# Patient Record
Sex: FEMALE | Race: WHITE | Employment: OTHER | URBAN - NONMETROPOLITAN AREA
[De-identification: names, ages, dates, MRNs, and addresses within clinical notes are randomized per-mention and may not be internally consistent; named-entity substitution may affect disease eponyms.]

---

## 2020-10-05 RX ORDER — PERINDOPRIL ERBUMINE 8 MG/1
TABLET ORAL
Qty: 90 TAB | Refills: 2 | Status: SHIPPED | OUTPATIENT
Start: 2020-10-05 | End: 2021-07-29

## 2020-10-19 ENCOUNTER — OFFICE VISIT (OUTPATIENT)
Dept: FAMILY MEDICINE CLINIC | Age: 75
End: 2020-10-19
Payer: MEDICARE

## 2020-10-19 ENCOUNTER — HOSPITAL ENCOUNTER (OUTPATIENT)
Dept: LAB | Age: 75
Discharge: HOME | End: 2020-10-19
Payer: MEDICARE

## 2020-10-19 VITALS
TEMPERATURE: 97.9 F | WEIGHT: 117 LBS | OXYGEN SATURATION: 98 % | BODY MASS INDEX: 21.53 KG/M2 | SYSTOLIC BLOOD PRESSURE: 139 MMHG | DIASTOLIC BLOOD PRESSURE: 76 MMHG | HEIGHT: 62 IN | HEART RATE: 72 BPM

## 2020-10-19 DIAGNOSIS — E55.9 HYPOVITAMINOSIS D: ICD-10-CM

## 2020-10-19 DIAGNOSIS — R41.3 MEMORY CHANGES: ICD-10-CM

## 2020-10-19 DIAGNOSIS — I82.419 DEEP VENOUS THROMBOSIS OF FEMORAL VEIN WITH THROMBOPHLEBITIS, UNSPECIFIED LATERALITY (HCC): ICD-10-CM

## 2020-10-19 DIAGNOSIS — M85.859 OSTEOPENIA OF NECK OF FEMUR, UNSPECIFIED LATERALITY: ICD-10-CM

## 2020-10-19 DIAGNOSIS — I10 ESSENTIAL HYPERTENSION: ICD-10-CM

## 2020-10-19 DIAGNOSIS — E53.8 VITAMIN B 12 DEFICIENCY: Primary | ICD-10-CM

## 2020-10-19 PROCEDURE — G8400 PT W/DXA NO RESULTS DOC: HCPCS | Performed by: FAMILY MEDICINE

## 2020-10-19 PROCEDURE — 99214 OFFICE O/P EST MOD 30 MIN: CPT | Performed by: FAMILY MEDICINE

## 2020-10-19 PROCEDURE — 1090F PRES/ABSN URINE INCON ASSESS: CPT | Performed by: FAMILY MEDICINE

## 2020-10-19 PROCEDURE — 1101F PT FALLS ASSESS-DOCD LE1/YR: CPT | Performed by: FAMILY MEDICINE

## 2020-10-19 PROCEDURE — G8420 CALC BMI NORM PARAMETERS: HCPCS | Performed by: FAMILY MEDICINE

## 2020-10-19 PROCEDURE — 82607 VITAMIN B-12: CPT

## 2020-10-19 PROCEDURE — G8510 SCR DEP NEG, NO PLAN REQD: HCPCS | Performed by: FAMILY MEDICINE

## 2020-10-19 PROCEDURE — 82746 ASSAY OF FOLIC ACID SERUM: CPT

## 2020-10-19 PROCEDURE — G8536 NO DOC ELDER MAL SCRN: HCPCS | Performed by: FAMILY MEDICINE

## 2020-10-19 PROCEDURE — G8427 DOCREV CUR MEDS BY ELIG CLIN: HCPCS | Performed by: FAMILY MEDICINE

## 2020-10-19 NOTE — PROGRESS NOTES
Subjective: Sheryl Lancaster is a 67 y.o. female who was seen for Follow-up    HPI patient is a 42-year-old female who is seen for evaluation. No nausea vomiting or diarrhea. No cough or cold. No chest pain or shortness of breath. No rash no syncope no loss of consciousness. Bowel movements have been appropriate. Eating relatively well. Her son called and said several of his mother's friends were concerned about her repeating stories etc.  She does not do the family budget. There is no chest pain or shortness of breath. Bowel movements have been appropriate. Nobody at home is been sick. No falls or injuries no rash no syncope or loss of consciousness. She does not think she has any memory issues that are significant. Her mother had a late onset memory issues. She is eating well she is under a significant amount of stress from her children and grandchildren. Home Medications    Medication Sig Start Date End Date Taking? Authorizing Provider   perindopril (ACEON) 8 mg tablet TAKE ONE TABLET BY MOUTH EVERY DAY FOR HIGH BLOOD PRESSURE 10/5/20   Romina Allen MD      No Known Allergies  Social History     Tobacco Use    Smoking status: Never Smoker    Smokeless tobacco: Former User   Substance Use Topics    Alcohol use: Not on file    Drug use: Never            Review of Systems   Constitutional: Positive for fatigue. HENT: Negative. Eyes: Negative. Respiratory: Negative. Cardiovascular: Negative. Gastrointestinal: Negative. Genitourinary: Negative. Musculoskeletal: Negative. Allergic/Immunologic: Negative. Neurological: Negative. Hematological: Negative. Psychiatric/Behavioral: Negative.          Physical Exam   Objective:     Visit Vitals  /76 (BP 1 Location: Left arm, BP Patient Position: Sitting)   Pulse 72   Temp 97.9 °F (36.6 °C) (Temporal)   Ht 5' 2\" (1.575 m)   Wt 117 lb (53.1 kg)   SpO2 98%   BMI 21.40 kg/m²      General: alert, cooperative, no distress   Mental  status: normal mood, behavior, speech, dress, motor activity, and thought processes, able to follow commands   HENT: NCAT   Neck: no visualized mass   Resp: no respiratory distress   Neuro: no gross deficits   Skin: no discoloration or lesions of concern on visible areas   Psychiatric: normal affect, consistent with stated mood, no evidence of hallucinations   L signs are stable. She is oriented x3 she is not clinically anxious or depressed. She has no edema she has no rash. Assessment & Plan:     Memory changes history of DVT hypertension hypovitaminosis D: This was a 40-minute evaluation. It was face-to-face. I performed a Mini-Mental status exam it was 26 out of 30 she seems to be clear a little emotionally upset by the fact that her sons asked her to get this and did not tell her why I am going to get a CAT scan I am going to get a B12 level because of B12 deficiency I am going to get a folic acid level as well. I do not think she has significant dementia. I do think the scan might help she has been on medicine for DVT for greater than 6 months we may need to consider stopping that        712    Additional exam findings: We discussed the expected course, resolution and complications of the diagnosis(es) in detail. Medication risks, benefits, costs, interactions, and alternatives were discussed as indicated. I advised her to contact the office if her condition worsens, changes or fails to improve as anticipated. She expressed understanding with the diagnosis(es) and plan.

## 2020-10-19 NOTE — PROGRESS NOTES
Jamaal Summers presents today for   Chief Complaint   Patient presents with    Follow-up       Is someone accompanying this pt? no    Is the patient using any DME equipment during [address]? no    Depression Screening:  3 most recent PHQ Screens 10/19/2020   Little interest or pleasure in doing things Not at all   Feeling down, depressed, irritable, or hopeless Not at all   Total Score PHQ 2 0       Learning Assessment:  Learning Assessment 10/19/2020   PRIMARY LEARNER Patient   PRIMARY LANGUAGE ENGLISH   LEARNER PREFERENCE PRIMARY DEMONSTRATION   ANSWERED BY patient   RELATIONSHIP SELF       Abuse Screening:  No flowsheet data found. Fall Risk  Fall Risk Assessment, last 12 mths 10/19/2020   Able to walk? Yes   Fall in past 12 months? No       Health Maintenance reviewed and discussed and ordered per Provider. Health Maintenance Due   Topic Date Due    DTaP/Tdap/Td series (1 - Tdap) 04/28/1965    Shingrix Vaccine Age 50> (1 of 2) 04/28/1994    GLAUCOMA SCREENING Q2Y  04/28/2009    Bone Densitometry (Dexa) Screening  04/28/2009    Pneumococcal 65+ years (1 of 1 - PPSV23) 04/28/2009    Medicare Yearly Exam  04/01/2020    Flu Vaccine (1) 09/01/2020   . Coordination of Care:  1. Have you been to the ER, urgent care clinic since your last visit? Hospitalized since your last visit? no    2. Have you seen or consulted any other health care providers outside of the [address] [place] [address] since your last visit? Include any pap smears or colon screening.  no    Last UDS Checked no  Last Pain contract signed: no

## 2020-10-20 LAB
FOLATE SERPL-MCNC: 14.5 NG/ML (ref 5–21)
VIT B12 SERPL-MCNC: 1770 PG/ML (ref 193–986)

## 2020-10-22 ENCOUNTER — HOSPITAL ENCOUNTER (OUTPATIENT)
Dept: CT IMAGING | Age: 75
Discharge: HOME | End: 2020-10-22
Attending: FAMILY MEDICINE
Payer: MEDICARE

## 2020-10-22 DIAGNOSIS — R41.3 MEMORY CHANGES: ICD-10-CM

## 2020-10-22 PROCEDURE — 70450 CT HEAD/BRAIN W/O DYE: CPT

## 2020-10-25 ENCOUNTER — TELEPHONE (OUTPATIENT)
Dept: FAMILY MEDICINE CLINIC | Age: 75
End: 2020-10-25

## 2020-10-25 DIAGNOSIS — R41.3 MEMORY CHANGES: Primary | ICD-10-CM

## 2020-10-25 RX ORDER — MEMANTINE HYDROCHLORIDE 5 MG/1
5 TABLET ORAL DAILY
Qty: 60 TAB | Refills: 1 | Status: SHIPPED | OUTPATIENT
Start: 2020-10-25 | End: 2020-12-15

## 2020-10-25 NOTE — TELEPHONE ENCOUNTER
Patient. Her S52 folic acid were normal her CT of the head was normal as well. Her Namenda 5 mg twice daily. I do not know that it will help her Mini-Mental status was 26 but we will follow-up with her and see.

## 2020-12-15 ENCOUNTER — VIRTUAL VISIT (OUTPATIENT)
Dept: FAMILY MEDICINE CLINIC | Age: 75
End: 2020-12-15
Payer: MEDICARE

## 2020-12-15 DIAGNOSIS — R41.3 MEMORY CHANGES: Primary | ICD-10-CM

## 2020-12-15 DIAGNOSIS — F41.9 ANXIETY: Primary | ICD-10-CM

## 2020-12-15 PROCEDURE — 99442 PR PHYS/QHP TELEPHONE EVALUATION 11-20 MIN: CPT | Performed by: FAMILY MEDICINE

## 2020-12-15 RX ORDER — MEMANTINE HYDROCHLORIDE 10 MG/1
10 TABLET ORAL 2 TIMES DAILY
Qty: 60 TAB | Refills: 1 | Status: SHIPPED | OUTPATIENT
Start: 2020-12-15 | End: 2021-05-04 | Stop reason: SDUPTHER

## 2020-12-15 NOTE — PROGRESS NOTES
Damion Warren is a 67 y.o. female, evaluated via audio-only technology on 12/15/2020 for No chief complaint on file. .    Assessment & Plan: Memory changes: I called the patient we increase her Namenda to 10 mg twice daily. I talked with her  today I talked with the son who is a nurse who reported several of her friends were reporting repeating the patient does not think it is an issue we put her on Namenda 5 mg she tolerated it well and will need to increase her to 10 mg this was a telephone to telephone visit it was 15 minutes. The patient was at home I was in my office. We will follow up in several months for reevaluation this was a 15-minute visit with voice to voice communication the patient at her home I was in my office       12  Subjective: I talked to the patient today her  was concerned that she was having increased forgetfulness episodes. She does not think she has gotten lost or anything is nothing to worry about but her son reported that and several friends of hers as well we placed her on Namenda 5 mg she tolerated it well I think we are going to increase her to 10 mg twice daily. She seems to be doing well with the medicine I am not sure the addition of Aricept would be helpful. She has not lost herself she is knows where she is going when she is driving she is complaining that her life is filled with stress from her children. Prior to Admission medications    Medication Sig Start Date End Date Taking? Authorizing Provider   memantine (Namenda) 5 mg tablet Take 1 Tab by mouth daily.  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 Eyes: Negative. Respiratory: Negative. Cardiovascular: Negative. Gastrointestinal: Negative. Genitourinary: Negative. Musculoskeletal: Negative. Skin: Negative. Neurological: Negative. Psychiatric/Behavioral: Positive for memory loss. No flowsheet data found. Jay Teran, who was evaluated through a patient-initiated, synchronous (real-time) audio only encounter, and/or her healthcare decision maker, is aware that it is a billable service, with coverage as determined by her insurance carrier. She provided verbal consent to proceed: n/a- consent obtained within past 12 months. She has not had a related appointment within my department in the past 7 days or scheduled within the next 24 hours.       Total Time: minutes: 11-20 minutes    Josseline Monroy MD

## 2020-12-16 RX ORDER — LORAZEPAM 0.5 MG/1
TABLET ORAL
Qty: 30 TAB | Refills: 0 | Status: SHIPPED | OUTPATIENT
Start: 2020-12-16

## 2021-03-25 ENCOUNTER — TELEPHONE (OUTPATIENT)
Dept: FAMILY MEDICINE CLINIC | Age: 76
End: 2021-03-25

## 2021-04-12 RX ORDER — RALOXIFENE HYDROCHLORIDE 60 MG/1
TABLET, FILM COATED ORAL
Qty: 90 TAB | Refills: 1 | Status: SHIPPED | OUTPATIENT
Start: 2021-04-12 | End: 2021-12-17 | Stop reason: SDUPTHER

## 2021-05-04 DIAGNOSIS — R41.3 MEMORY CHANGES: ICD-10-CM

## 2021-05-04 NOTE — TELEPHONE ENCOUNTER
Requested Prescriptions     Pending Prescriptions Disp Refills    memantine (Namenda) 10 mg tablet 60 Tab 1     Sig: Take 1 Tab by mouth two (2) times a day.          [facility] in [place]

## 2021-05-06 RX ORDER — MEMANTINE HYDROCHLORIDE 10 MG/1
10 TABLET ORAL 2 TIMES DAILY
Qty: 60 TAB | Refills: 1 | Status: SHIPPED | OUTPATIENT
Start: 2021-05-06 | End: 2021-09-07

## 2021-06-03 RX ORDER — AMLODIPINE BESYLATE 5 MG/1
TABLET ORAL
Qty: 90 TABLET | Refills: 1 | Status: SHIPPED | OUTPATIENT
Start: 2021-06-03 | End: 2021-12-03 | Stop reason: SDUPTHER

## 2021-06-04 ENCOUNTER — TRANSCRIBE ORDER (OUTPATIENT)
Dept: REGISTRATION | Age: 76
End: 2021-06-04

## 2021-06-04 ENCOUNTER — HOSPITAL ENCOUNTER (OUTPATIENT)
Dept: LAB | Age: 76
Discharge: HOME | End: 2021-06-04
Payer: MEDICARE

## 2021-06-04 DIAGNOSIS — G31.84 MCI (MILD COGNITIVE IMPAIRMENT) WITH MEMORY LOSS: Primary | ICD-10-CM

## 2021-06-04 DIAGNOSIS — G31.84 MCI (MILD COGNITIVE IMPAIRMENT) WITH MEMORY LOSS: ICD-10-CM

## 2021-06-04 LAB
FOLATE SERPL-MCNC: 14 NG/ML (ref 5–21)
TSH SERPL DL<=0.05 MIU/L-ACNC: 3.19 UIU/ML (ref 0.35–6.2)
VIT B12 SERPL-MCNC: 1210 PG/ML (ref 193–986)

## 2021-06-04 PROCEDURE — 84443 ASSAY THYROID STIM HORMONE: CPT

## 2021-06-04 PROCEDURE — 36415 COLL VENOUS BLD VENIPUNCTURE: CPT

## 2021-06-04 PROCEDURE — 82746 ASSAY OF FOLIC ACID SERUM: CPT

## 2021-06-04 PROCEDURE — 82607 VITAMIN B-12: CPT

## 2021-06-25 ENCOUNTER — TELEPHONE (OUTPATIENT)
Dept: FAMILY MEDICINE CLINIC | Age: 76
End: 2021-06-25

## 2021-06-25 NOTE — TELEPHONE ENCOUNTER
The patient.   The son called me and said she was having chest pain and crying to the patient on the phone I advised her she needed to go to the ER

## 2021-07-29 RX ORDER — PERINDOPRIL ERBUMINE 8 MG/1
TABLET ORAL
Qty: 90 TABLET | Refills: 2 | Status: SHIPPED | OUTPATIENT
Start: 2021-07-29 | End: 2022-08-30

## 2021-08-10 ENCOUNTER — APPOINTMENT (OUTPATIENT)
Dept: CT IMAGING | Age: 76
End: 2021-08-10
Attending: EMERGENCY MEDICINE
Payer: MEDICARE

## 2021-08-10 ENCOUNTER — HOSPITAL ENCOUNTER (EMERGENCY)
Age: 76
Discharge: HOME | End: 2021-08-10
Attending: EMERGENCY MEDICINE
Payer: MEDICARE

## 2021-08-10 VITALS
HEIGHT: 62 IN | TEMPERATURE: 97.4 F | OXYGEN SATURATION: 97 % | DIASTOLIC BLOOD PRESSURE: 79 MMHG | SYSTOLIC BLOOD PRESSURE: 147 MMHG | WEIGHT: 117 LBS | HEART RATE: 77 BPM | RESPIRATION RATE: 16 BRPM | BODY MASS INDEX: 21.53 KG/M2

## 2021-08-10 DIAGNOSIS — R51.9 OCCIPITAL HEADACHE: ICD-10-CM

## 2021-08-10 DIAGNOSIS — E87.6 HYPOKALEMIA: ICD-10-CM

## 2021-08-10 DIAGNOSIS — R11.2 NON-INTRACTABLE VOMITING WITH NAUSEA, UNSPECIFIED VOMITING TYPE: ICD-10-CM

## 2021-08-10 DIAGNOSIS — F41.8 SITUATIONAL ANXIETY: Primary | ICD-10-CM

## 2021-08-10 LAB
ALBUMIN SERPL-MCNC: 4.5 G/DL (ref 3.5–4.7)
ALBUMIN/GLOB SERPL: 1.4 {RATIO}
ALP SERPL-CCNC: 56 U/L (ref 38–126)
ALT SERPL-CCNC: 17 U/L (ref 3–52)
ANION GAP SERPL CALC-SCNC: 11 MMOL/L
APPEARANCE UR: CLEAR
AST SERPL W P-5'-P-CCNC: 26 U/L (ref 14–74)
BASOPHILS # BLD: 0 K/UL (ref 0–0.1)
BASOPHILS NFR BLD: 1 % (ref 0–2)
BILIRUB SERPL-MCNC: 0.8 MG/DL (ref 0.2–1)
BILIRUB UR QL: NEGATIVE
BUN SERPL-MCNC: 11 MG/DL (ref 9–21)
BUN/CREAT SERPL: 22
CA-I BLD-MCNC: 9.5 MG/DL (ref 8.5–10.5)
CHLORIDE SERPL-SCNC: 99 MMOL/L (ref 94–111)
CO2 SERPL-SCNC: 27 MMOL/L (ref 21–33)
COLOR UR: YELLOW
CREAT SERPL-MCNC: 0.5 MG/DL (ref 0.7–1.2)
EOSINOPHIL # BLD: 0 K/UL (ref 0–0.4)
EOSINOPHIL NFR BLD: 0 % (ref 0–5)
ERYTHROCYTE [DISTWIDTH] IN BLOOD BY AUTOMATED COUNT: 13.1 % (ref 11.6–14.5)
ERYTHROCYTE [SEDIMENTATION RATE] IN BLOOD: >1 MM/HR
GLOBULIN SER CALC-MCNC: 3.2 G/DL
GLUCOSE SERPL-MCNC: 108 MG/DL (ref 70–110)
GLUCOSE UR STRIP.AUTO-MCNC: NEGATIVE MG/DL
HCT VFR BLD AUTO: 42.8 % (ref 35–45)
HGB BLD-MCNC: 14 G/DL (ref 12–16)
HGB UR QL STRIP: NEGATIVE
IMM GRANULOCYTES # BLD AUTO: 0 K/UL
IMM GRANULOCYTES NFR BLD AUTO: 0 %
KETONES UR QL STRIP.AUTO: NEGATIVE MG/DL
LEUKOCYTE ESTERASE UR QL STRIP.AUTO: NEGATIVE
LYMPHOCYTES # BLD: 0.8 K/UL (ref 0.9–3.6)
LYMPHOCYTES NFR BLD: 13 % (ref 21–52)
MCH RBC QN AUTO: 30.9 PG (ref 24–34)
MCHC RBC AUTO-ENTMCNC: 32.7 G/DL (ref 31–37)
MCV RBC AUTO: 94.5 FL (ref 74–97)
MONOCYTES # BLD: 0.4 K/UL (ref 0.05–1.2)
MONOCYTES NFR BLD: 6 % (ref 3–10)
NEUTS SEG # BLD: 5.1 K/UL (ref 1.8–8)
NEUTS SEG NFR BLD: 80 % (ref 40–73)
NITRITE UR QL STRIP.AUTO: NEGATIVE
PH UR STRIP: >8.5 [PH] (ref 5–8)
PLATELET # BLD AUTO: 249 K/UL (ref 135–420)
PMV BLD AUTO: 10.4 FL (ref 9.2–11.8)
POTASSIUM SERPL-SCNC: 3.1 MMOL/L (ref 3.2–5.1)
PROT SERPL-MCNC: 7.7 G/DL (ref 6.1–8.4)
PROT UR STRIP-MCNC: NEGATIVE MG/DL
RBC # BLD AUTO: 4.53 M/UL (ref 4.2–5.3)
SODIUM SERPL-SCNC: 137 MMOL/L (ref 135–145)
SP GR UR REFRACTOMETRY: 1.01 (ref 1–1.03)
UROBILINOGEN UR QL STRIP.AUTO: 0.2 EU/DL (ref 0.2–1)
WBC # BLD AUTO: 6.4 K/UL (ref 4.6–13.2)

## 2021-08-10 PROCEDURE — 85651 RBC SED RATE NONAUTOMATED: CPT

## 2021-08-10 PROCEDURE — 85025 COMPLETE CBC W/AUTO DIFF WBC: CPT

## 2021-08-10 PROCEDURE — 74011250636 HC RX REV CODE- 250/636: Performed by: EMERGENCY MEDICINE

## 2021-08-10 PROCEDURE — 80053 COMPREHEN METABOLIC PANEL: CPT

## 2021-08-10 PROCEDURE — 70450 CT HEAD/BRAIN W/O DYE: CPT

## 2021-08-10 PROCEDURE — 99283 EMERGENCY DEPT VISIT LOW MDM: CPT

## 2021-08-10 PROCEDURE — 96374 THER/PROPH/DIAG INJ IV PUSH: CPT

## 2021-08-10 PROCEDURE — 81003 URINALYSIS AUTO W/O SCOPE: CPT

## 2021-08-10 PROCEDURE — 75810000275 HC EMERGENCY DEPT VISIT NO LEVEL OF CARE

## 2021-08-10 PROCEDURE — 96375 TX/PRO/DX INJ NEW DRUG ADDON: CPT

## 2021-08-10 PROCEDURE — 74011250637 HC RX REV CODE- 250/637: Performed by: EMERGENCY MEDICINE

## 2021-08-10 RX ORDER — POTASSIUM CHLORIDE 750 MG/1
40 TABLET, EXTENDED RELEASE ORAL
Status: COMPLETED | OUTPATIENT
Start: 2021-08-10 | End: 2021-08-10

## 2021-08-10 RX ORDER — ONDANSETRON 4 MG/1
4 TABLET, ORALLY DISINTEGRATING ORAL
Qty: 10 TABLET | Refills: 0 | Status: SHIPPED | OUTPATIENT
Start: 2021-08-10 | End: 2022-08-18 | Stop reason: ALTCHOICE

## 2021-08-10 RX ORDER — ONDANSETRON 2 MG/ML
4 INJECTION INTRAMUSCULAR; INTRAVENOUS
Status: COMPLETED | OUTPATIENT
Start: 2021-08-10 | End: 2021-08-10

## 2021-08-10 RX ORDER — KETOROLAC TROMETHAMINE 30 MG/ML
15 INJECTION, SOLUTION INTRAMUSCULAR; INTRAVENOUS
Status: COMPLETED | OUTPATIENT
Start: 2021-08-10 | End: 2021-08-10

## 2021-08-10 RX ADMIN — KETOROLAC TROMETHAMINE 15 MG: 30 INJECTION, SOLUTION INTRAMUSCULAR; INTRAVENOUS at 17:23

## 2021-08-10 RX ADMIN — ONDANSETRON 4 MG: 2 INJECTION INTRAMUSCULAR; INTRAVENOUS at 17:24

## 2021-08-10 RX ADMIN — POTASSIUM CHLORIDE 40 MEQ: 10 TABLET, EXTENDED RELEASE ORAL at 19:11

## 2021-08-10 NOTE — ED TRIAGE NOTES
Pt states that she feels \"bad\" and has not eaten anything since this am, feels nauseated at times.

## 2021-08-15 NOTE — ED PROVIDER NOTES
EMERGENCY DEPARTMENT HISTORY AND PHYSICAL EXAM      Date: 8/10/2021  Patient Name: Andry Green    History of Presenting Illness     Chief Complaint   Patient presents with    Nausea       History Provided By: Patient    HPI: Andry Green, 67 y.o. female with a past medical history significant Hypertension, DVT presents to the ED with cc of nausea. Patient brought in by  who reports patient has been feeling bad since this morning. Patient states she just feels sick and seems to be nauseated. He however denies any pain. No vomiting or diarrhea. He is having difficulties explaining his illness however  states he recently took her to her PCP who advised her she has early memory loss. This morning  told patient she most likely has dementia and she became very upset.  states patient seem to have pain in her neck and was complaining of pain behind her head . Francisca  There is no history of previous headaches.  stated patient does not know she has dementia but her PCP advised she most likely has dementia. She was a started on Namenda. There are no other complaints, changes, or physical findings at this time. PCP: David Garland MD    No current facility-administered medications on file prior to encounter. Current Outpatient Medications on File Prior to Encounter   Medication Sig Dispense Refill    perindopril (ACEON) 8 mg tablet TAKE ONE TABLET BY MOUTH EVERY DAY FOR HIGH BLOOD PRESSURE 90 Tablet 2    amLODIPine (NORVASC) 5 mg tablet TAKE ONE TABLET BY MOUTH ONE TIME DAILY 90 Tablet 1    memantine (Namenda) 10 mg tablet Take 1 Tab by mouth two (2) times a day.  60 Tab 1    raloxifene (EVISTA) 60 mg tablet TAKE ONE TABLET BY MOUTH ONE TIME DAILY 90 Tab 1    LORazepam (ATIVAN) 0.5 mg tablet take 1 tablet by mouth once daily for 30 DAYS 30 Tab 0       Past History     Past Medical History:  Past Medical History:   Diagnosis Date    Deep vein blood clot of right lower extremity ([facility] [address] 01/05/2020    Hypertension        Past Surgical History:  History reviewed. No pertinent surgical history. Family History:  History reviewed. No pertinent family history. Social History:  Social History     Tobacco Use    Smoking status: Never Smoker    Smokeless tobacco: Former User   Substance Use Topics    Alcohol use: Not on file    Drug use: Never       Allergies:  No Known Allergies      Review of Systems     Review of Systems   Constitutional: Positive for appetite change. Negative for chills and fever. HENT: Negative for congestion and sore throat. Respiratory: Negative for cough and shortness of breath. Cardiovascular: Negative for chest pain. Gastrointestinal: Positive for nausea. Negative for abdominal distention and vomiting. Genitourinary: Negative for difficulty urinating, dysuria, flank pain, frequency, hematuria, urgency, vaginal bleeding and vaginal discharge. Musculoskeletal: Negative for arthralgias and joint swelling. Skin: Negative for rash and wound. Neurological: Negative for dizziness, weakness, light-headedness and headaches. Hematological: Negative for adenopathy. Psychiatric/Behavioral: Positive for dysphoric mood. The patient is nervous/anxious. Physical Exam   Physical Exam  Vitals and nursing note reviewed. Constitutional:       General: She is not in acute distress. Appearance: She is well-developed. She is not diaphoretic. Comments: Elderly female who is in no acute distress but is upset. She states she is upset because she is \"just sick\". She however is able to follow commands. HENT:      Head: Normocephalic and atraumatic. Jaw: No trismus. Right Ear: External ear normal. No swelling or tenderness. Tympanic membrane is not perforated, erythematous or bulging. Left Ear: External ear normal. No swelling or tenderness. Tympanic membrane is not perforated, erythematous or bulging.       Nose: Nose normal. No mucosal edema or rhinorrhea. Right Sinus: No maxillary sinus tenderness or frontal sinus tenderness. Left Sinus: No maxillary sinus tenderness or frontal sinus tenderness. Mouth/Throat:      Mouth: No oral lesions. Dentition: No dental abscesses. Pharynx: Uvula midline. No oropharyngeal exudate, posterior oropharyngeal erythema or uvula swelling. Tonsils: No tonsillar abscesses. Eyes:      General: No scleral icterus. Right eye: No discharge. Left eye: No discharge. Conjunctiva/sclera: Conjunctivae normal.   Cardiovascular:      Rate and Rhythm: Normal rate and regular rhythm. Heart sounds: Normal heart sounds. No murmur heard. No friction rub. No gallop. Pulmonary:      Effort: Pulmonary effort is normal. No tachypnea, accessory muscle usage or respiratory distress. Breath sounds: Normal breath sounds. No decreased breath sounds, wheezing, rhonchi or rales. Abdominal:      Palpations: Abdomen is soft. Musculoskeletal:         General: No tenderness. Normal range of motion. Cervical back: Normal range of motion and neck supple. Lymphadenopathy:      Cervical: No cervical adenopathy. Skin:     General: Skin is warm and dry. Findings: No erythema or rash. Neurological:      General: No focal deficit present. Mental Status: She is alert and oriented to person, place, and time. Cranial Nerves: No cranial nerve deficit. Sensory: No sensory deficit. Psychiatric:         Judgment: Judgment normal.         Lab and Diagnostic Study Results     Labs -   No results found for this or any previous visit (from the past 12 hour(s)). Radiologic Studies -   @lastxrresult@  CT Results  (Last 48 hours)    None        CXR Results  (Last 48 hours)    None            Medical Decision Making   - I am the first provider for this patient.     - I reviewed the vital signs, available nursing notes, past medical history, past surgical history, family history and social history. - Initial assessment performed. The patients presenting problems have been discussed, and they are in agreement with the care plan formulated and outlined with them. I have encouraged them to ask questions as they arise throughout their visit. Vital Signs-Reviewed the patient's vital signs. No data found. Records Reviewed: Nursing Notes, Old Medical Records, Previous Radiology Studies and Previous Laboratory Studies    The patient presents with nausea and upset with a differential diagnosis of anxiety, panic attack, medication reaction, depression, electrolyte abnormalities, UTI      ED Course:   Patient with possible early dementia. He is oriented x3 in ED though. She does still complain of pain upper part of the neck necessitating CT scan of head which is negative. Potassium is slightly low and it was replaced with 40 mEq orally. She appears stable. She seemed to improve with a small dose of Ativan. I encouraged her  to have patient follow-up with PCP or return to ED should symptoms worsen. Provider Notes (Medical Decision Making):           Procedures   Medical Decision Makingedical Decision Making      Disposition   Disposition: Condition stable and improved  DC- Adult Discharges: All of the diagnostic tests were reviewed and questions answered. Diagnosis, care plan and treatment options were discussed. The patient understands the instructions and will follow up as directed. The patients results have been reviewed with them. They have been counseled regarding their diagnosis. The patient and spouse/SO verbally convey understanding and agreement of the signs, symptoms, diagnosis, treatment and prognosis and additionally agrees to follow up as recommended with their PCP in 24 - 48 hours. They also agree with the care-plan and convey that all of their questions have been answered.   I have also put together some discharge instructions for them that include: 1) educational information regarding their diagnosis, 2) how to care for their diagnosis at home, as well a 3) list of reasons why they would want to return to the ED prior to their follow-up appointment, should their condition change. Diagnosis:   1. Situational anxiety    2. Occipital headache    3. Hypokalemia    4. Non-intractable vomiting with nausea, unspecified vomiting type          Disposition:     Follow-up Information     Follow up With Specialties Details Why Vanessa Fried MD [facility]   [address] [facility] [address]  [phone]            Discharge Medication List as of 8/10/2021  7:12 PM      START taking these medications    Details   ondansetron (Zofran ODT) 4 mg disintegrating tablet 1 Tablet by SubLINGual route every eight (8) hours as needed for Nausea or Vomiting., Normal, Disp-10 Tablet, R-0         CONTINUE these medications which have NOT CHANGED    Details   perindopril (ACEON) 8 mg tablet TAKE ONE TABLET BY MOUTH EVERY DAY FOR HIGH BLOOD PRESSURE, Normal, Disp-90 Tablet, R-2      amLODIPine (NORVASC) 5 mg tablet TAKE ONE TABLET BY MOUTH ONE TIME DAILY, Normal, Disp-90 Tablet, R-1      memantine (Namenda) 10 mg tablet Take 1 Tab by mouth two (2) times a day., Normal, Disp-60 Tab, R-1      raloxifene (EVISTA) 60 mg tablet TAKE ONE TABLET BY MOUTH ONE TIME DAILY, Normal, Disp-90 Tab, R-1      LORazepam (ATIVAN) 0.5 mg tablet take 1 tablet by mouth once daily for 30 DAYS, Normal, Disp-30 Tab, R-0             DISCHARGE PLAN:  1. Cannot display discharge medications since this patient is not currently admitted. 2.   Follow-up Information     Follow up With Specialties Details Why Vanessa Fried MD [address] [address]  [phone]          3. Return to ED if worse   4.    Discharge Medication List as of 8/10/2021  7:12 PM      START taking these medications Details   ondansetron (Zofran ODT) 4 mg disintegrating tablet 1 Tablet by SubLINGual route every eight (8) hours as needed for Nausea or Vomiting., Normal, Disp-10 Tablet, R-0         CONTINUE these medications which have NOT CHANGED    Details   perindopril (ACEON) 8 mg tablet TAKE ONE TABLET BY MOUTH EVERY DAY FOR HIGH BLOOD PRESSURE, Normal, Disp-90 Tablet, R-2      amLODIPine (NORVASC) 5 mg tablet TAKE ONE TABLET BY MOUTH ONE TIME DAILY, Normal, Disp-90 Tablet, R-1      memantine (Namenda) 10 mg tablet Take 1 Tab by mouth two (2) times a day., Normal, Disp-60 Tab, R-1      raloxifene (EVISTA) 60 mg tablet TAKE ONE TABLET BY MOUTH ONE TIME DAILY, Normal, Disp-90 Tab, R-1      LORazepam (ATIVAN) 0.5 mg tablet take 1 tablet by mouth once daily for 30 DAYS, Normal, Disp-30 Tab, R-0               Diagnosis     Clinical Impression:   1. Situational anxiety    2. Occipital headache    3. Hypokalemia    4. Non-intractable vomiting with nausea, unspecified vomiting type        Attestations:    Tamia Latif MD    Please note that this dictation was completed with [email or web address], the computer voice recognition software. Quite often unanticipated grammatical, syntax, homophones, and other interpretive errors are inadvertently transcribed by the computer software. Please disregard these errors. Please excuse any errors that have escaped final proofreading. Thank you.

## 2021-09-06 DIAGNOSIS — R41.3 MEMORY CHANGES: ICD-10-CM

## 2021-09-07 RX ORDER — MEMANTINE HYDROCHLORIDE 10 MG/1
TABLET ORAL
Qty: 60 TABLET | Refills: 1 | Status: SHIPPED | OUTPATIENT
Start: 2021-09-07

## 2021-12-03 RX ORDER — AMLODIPINE BESYLATE 5 MG/1
5 TABLET ORAL DAILY
Qty: 90 TABLET | Refills: 0 | Status: SHIPPED | OUTPATIENT
Start: 2021-12-03 | End: 2022-02-17 | Stop reason: SDUPTHER

## 2021-12-03 NOTE — TELEPHONE ENCOUNTER
Patient has an appointment with you on 1/13/2021 but [facility]

## 2021-12-17 NOTE — TELEPHONE ENCOUNTER
Patient has an appointment 1/13. Fax request from [address] [place] [address] for Raloxifene.

## 2021-12-20 RX ORDER — RALOXIFENE HYDROCHLORIDE 60 MG/1
60 TABLET, FILM COATED ORAL DAILY
Qty: 90 TABLET | Refills: 0 | Status: SHIPPED | OUTPATIENT
Start: 2021-12-20 | End: 2022-01-13

## 2022-01-13 ENCOUNTER — HOSPITAL ENCOUNTER (OUTPATIENT)
Dept: LAB | Age: 77
Discharge: HOME | End: 2022-01-13
Payer: MEDICARE

## 2022-01-13 ENCOUNTER — OFFICE VISIT (OUTPATIENT)
Dept: FAMILY MEDICINE CLINIC | Age: 77
End: 2022-01-13
Payer: MEDICARE

## 2022-01-13 VITALS
SYSTOLIC BLOOD PRESSURE: 160 MMHG | HEART RATE: 81 BPM | DIASTOLIC BLOOD PRESSURE: 78 MMHG | WEIGHT: 115 LBS | BODY MASS INDEX: 21.16 KG/M2 | OXYGEN SATURATION: 98 % | TEMPERATURE: 97.5 F | HEIGHT: 62 IN

## 2022-01-13 DIAGNOSIS — M85.859 OSTEOPENIA OF NECK OF FEMUR, UNSPECIFIED LATERALITY: Primary | ICD-10-CM

## 2022-01-13 DIAGNOSIS — Z00.00 MEDICARE ANNUAL WELLNESS VISIT, SUBSEQUENT: ICD-10-CM

## 2022-01-13 DIAGNOSIS — I10 ESSENTIAL HYPERTENSION: ICD-10-CM

## 2022-01-13 DIAGNOSIS — F03.90 DEMENTIA WITHOUT BEHAVIORAL DISTURBANCE, UNSPECIFIED DEMENTIA TYPE: ICD-10-CM

## 2022-01-13 DIAGNOSIS — I82.419 DEEP VENOUS THROMBOSIS OF FEMORAL VEIN WITH THROMBOPHLEBITIS, UNSPECIFIED LATERALITY (HCC): ICD-10-CM

## 2022-01-13 LAB
25(OH)D3 SERPL-MCNC: 27.5 NG/ML (ref 30–100)
ALBUMIN SERPL-MCNC: 4.3 G/DL (ref 3.5–4.7)
ALBUMIN/GLOB SERPL: 1.4 {RATIO}
ALP SERPL-CCNC: 51 U/L (ref 38–126)
ALT SERPL-CCNC: 13 U/L (ref 3–52)
ANION GAP SERPL CALC-SCNC: 9 MMOL/L
AST SERPL W P-5'-P-CCNC: 21 U/L (ref 14–74)
BASOPHILS # BLD: 0.1 K/UL (ref 0–0.1)
BASOPHILS NFR BLD: 2 % (ref 0–2)
BILIRUB SERPL-MCNC: 0.8 MG/DL (ref 0.2–1)
BUN SERPL-MCNC: 14 MG/DL (ref 9–21)
BUN/CREAT SERPL: 20
CA-I BLD-MCNC: 9.6 MG/DL (ref 8.5–10.1)
CA-I BLD-MCNC: 9.7 MG/DL (ref 8.5–10.5)
CHLORIDE SERPL-SCNC: 101 MMOL/L (ref 94–111)
CHOLEST SERPL-MCNC: 230 MG/DL
CO2 SERPL-SCNC: 31 MMOL/L (ref 21–33)
CREAT SERPL-MCNC: 0.7 MG/DL (ref 0.7–1.2)
CREAT UR-MCNC: 15 MG/DL (ref 30–125)
DIFFERENTIAL METHOD BLD: NORMAL
EOSINOPHIL # BLD: 0.1 K/UL (ref 0–0.4)
EOSINOPHIL NFR BLD: 2 % (ref 0–5)
ERYTHROCYTE [DISTWIDTH] IN BLOOD BY AUTOMATED COUNT: 13.1 % (ref 11.6–14.5)
GLOBULIN SER CALC-MCNC: 3.1 G/DL
GLUCOSE SERPL-MCNC: 97 MG/DL (ref 70–110)
HCT VFR BLD AUTO: 41.3 % (ref 35–45)
HDLC SERPL-MCNC: 91 MG/DL (ref 40–60)
HDLC SERPL: 2.5 {RATIO} (ref 0–5)
HGB BLD-MCNC: 13.5 G/DL (ref 12–16)
IMM GRANULOCYTES # BLD AUTO: 0 K/UL (ref 0–0.04)
IMM GRANULOCYTES NFR BLD AUTO: 0 % (ref 0–0.5)
LDLC SERPL CALC-MCNC: 127.8 MG/DL (ref 0–100)
LIPID PROFILE,FLP: ABNORMAL
LYMPHOCYTES # BLD: 1.3 K/UL (ref 0.9–3.6)
LYMPHOCYTES NFR BLD: 24 % (ref 21–52)
MCH RBC QN AUTO: 31.5 PG (ref 24–34)
MCHC RBC AUTO-ENTMCNC: 32.7 G/DL (ref 31–37)
MCV RBC AUTO: 96.3 FL (ref 78–100)
MICROALBUMIN UR-MCNC: <0.5 MG/DL (ref 0–3)
MICROALBUMIN/CREAT UR-RTO: ABNORMAL MGMALB/GCRE (ref 0–30)
MONOCYTES # BLD: 0.4 K/UL (ref 0.05–1.2)
MONOCYTES NFR BLD: 8 % (ref 3–10)
NEUTS SEG # BLD: 3.4 K/UL (ref 1.8–8)
NEUTS SEG NFR BLD: 64 % (ref 40–73)
NRBC # BLD: 0 K/UL (ref 0–0.01)
NRBC BLD-RTO: 0 PER 100 WBC
PLATELET # BLD AUTO: 242 K/UL (ref 135–420)
PMV BLD AUTO: 10.7 FL (ref 9.2–11.8)
POTASSIUM SERPL-SCNC: 3.2 MMOL/L (ref 3.2–5.1)
PROT SERPL-MCNC: 7.4 G/DL (ref 6.1–8.4)
PTH-INTACT SERPL-MCNC: 40.1 PG/ML (ref 18.4–88)
RBC # BLD AUTO: 4.29 M/UL (ref 4.2–5.3)
SODIUM SERPL-SCNC: 141 MMOL/L (ref 135–145)
TRIGL SERPL-MCNC: 56 MG/DL (ref ?–150)
TSH SERPL DL<=0.05 MIU/L-ACNC: 3.71 UIU/ML (ref 0.35–6.2)
VIT B12 SERPL-MCNC: 728 PG/ML (ref 211–911)
VLDLC SERPL CALC-MCNC: 11.2 MG/DL
WBC # BLD AUTO: 5.3 K/UL (ref 4.6–13.2)

## 2022-01-13 PROCEDURE — G8400 PT W/DXA NO RESULTS DOC: HCPCS | Performed by: STUDENT IN AN ORGANIZED HEALTH CARE EDUCATION/TRAINING PROGRAM

## 2022-01-13 PROCEDURE — 85025 COMPLETE CBC W/AUTO DIFF WBC: CPT

## 2022-01-13 PROCEDURE — 82607 VITAMIN B-12: CPT

## 2022-01-13 PROCEDURE — 80061 LIPID PANEL: CPT

## 2022-01-13 PROCEDURE — G8420 CALC BMI NORM PARAMETERS: HCPCS | Performed by: STUDENT IN AN ORGANIZED HEALTH CARE EDUCATION/TRAINING PROGRAM

## 2022-01-13 PROCEDURE — 1101F PT FALLS ASSESS-DOCD LE1/YR: CPT | Performed by: STUDENT IN AN ORGANIZED HEALTH CARE EDUCATION/TRAINING PROGRAM

## 2022-01-13 PROCEDURE — G8754 DIAS BP LESS 90: HCPCS | Performed by: STUDENT IN AN ORGANIZED HEALTH CARE EDUCATION/TRAINING PROGRAM

## 2022-01-13 PROCEDURE — G8753 SYS BP > OR = 140: HCPCS | Performed by: STUDENT IN AN ORGANIZED HEALTH CARE EDUCATION/TRAINING PROGRAM

## 2022-01-13 PROCEDURE — 80053 COMPREHEN METABOLIC PANEL: CPT

## 2022-01-13 PROCEDURE — G8510 SCR DEP NEG, NO PLAN REQD: HCPCS | Performed by: STUDENT IN AN ORGANIZED HEALTH CARE EDUCATION/TRAINING PROGRAM

## 2022-01-13 PROCEDURE — 36415 COLL VENOUS BLD VENIPUNCTURE: CPT

## 2022-01-13 PROCEDURE — G8536 NO DOC ELDER MAL SCRN: HCPCS | Performed by: STUDENT IN AN ORGANIZED HEALTH CARE EDUCATION/TRAINING PROGRAM

## 2022-01-13 PROCEDURE — 82043 UR ALBUMIN QUANTITATIVE: CPT

## 2022-01-13 PROCEDURE — G8427 DOCREV CUR MEDS BY ELIG CLIN: HCPCS | Performed by: STUDENT IN AN ORGANIZED HEALTH CARE EDUCATION/TRAINING PROGRAM

## 2022-01-13 PROCEDURE — 99214 OFFICE O/P EST MOD 30 MIN: CPT | Performed by: STUDENT IN AN ORGANIZED HEALTH CARE EDUCATION/TRAINING PROGRAM

## 2022-01-13 PROCEDURE — G0439 PPPS, SUBSEQ VISIT: HCPCS | Performed by: STUDENT IN AN ORGANIZED HEALTH CARE EDUCATION/TRAINING PROGRAM

## 2022-01-13 PROCEDURE — 82306 VITAMIN D 25 HYDROXY: CPT

## 2022-01-13 PROCEDURE — 83970 ASSAY OF PARATHORMONE: CPT

## 2022-01-13 PROCEDURE — 84443 ASSAY THYROID STIM HORMONE: CPT

## 2022-01-13 RX ORDER — ASPIRIN 81 MG/1
81 TABLET ORAL DAILY
COMMUNITY

## 2022-01-13 RX ORDER — MAGNESIUM 200 MG
1000 TABLET ORAL DAILY
COMMUNITY

## 2022-01-13 NOTE — PROGRESS NOTES
Cheng  presents today for   Chief Complaint   Patient presents with   [address]    Medication Refill       Is someone accompanying this pt? Chary Wolf,      Is the patient using any DME equipment during [address]? No     Depression Screening:  3 most recent PHQ Screens 1/13/2022   Little interest or pleasure in doing things Not at all   Feeling down, depressed, irritable, or hopeless Not at all   Total Score PHQ 2 0       Learning Assessment:  Learning Assessment 10/19/2020   PRIMARY LEARNER Patient   PRIMARY LANGUAGE ENGLISH   LEARNER PREFERENCE PRIMARY DEMONSTRATION   ANSWERED BY patient   RELATIONSHIP SELF       Fall Risk  Fall Risk Assessment, last 12 mths 10/19/2020   Able to walk? Yes   Fall in past 12 months? No       Health Maintenance reviewed and discussed and ordered per Provider. Health Maintenance Due   Topic Date Due    Hepatitis C Screening  Never done    DTaP/Tdap/Td series (1 - Tdap) Never done    Shingrix Vaccine Age 50> (1 of 2) Never done    Bone Densitometry (Dexa) Screening  Never done    Pneumococcal 65+ years (1 of 1 - PPSV23) Never done    Medicare Yearly Exam  Never done   . Coordination of Care:  1. Have you been to the ER, urgent care clinic since your last visit? Hospitalized since your last visit? No     2. Have you seen or consulted any other health care providers outside of the [address] [place] [address] since your last visit? Include any pap smears or colon screening.  No

## 2022-01-17 NOTE — PROGRESS NOTES
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 female who was seen for Establish Care and Medication Refill    Patient here for medication refills and wellness visit. She has no complaints. She has a history of DVT and was previously on anticoagulation. She has been on raloxifene for many years when she was diagnosed with osteopenia. She has never had a repeat DEXA scan. No recent fractures. Home Medications    Medication Sig Start Date End Date Taking? Authorizing Provider   aspirin delayed-release 81 mg tablet Take 81 mg by mouth daily. Yes Provider, Historical   cyanocobalamin (VITAMIN B-12) 1,000 mcg sublingual tablet Take 1,000 mcg by mouth daily. Yes Provider, Historical   amLODIPine (NORVASC) 5 mg tablet Take 1 Tablet by mouth daily. 12/3/21  Yes Rosa Hsu MD   perindopril (ACEON) 8 mg tablet TAKE ONE TABLET BY MOUTH EVERY DAY FOR HIGH BLOOD PRESSURE 7/29/21  Yes Darcie Rivers MD   LORazepam (ATIVAN) 0.5 mg tablet take 1 tablet by mouth once daily for 30 DAYS 12/16/20  Yes Darcie Rivers MD   [facility]) 10 mg tablet take 1 tablet by mouth twice a day 9/7/21   Darcie Rivers MD   ondansetron (Zofran ODT) 4 mg disintegrating tablet 1 Tablet by SubLINGual route every eight (8) hours as needed for Nausea or Vomiting. Patient not taking: Reported on 1/13/2022 8/10/21   Reagan Lucia MD      No Known Allergies  Social History     Tobacco Use    Smoking status: Never Smoker    Smokeless tobacco: Former User   Substance Use Topics    Alcohol use: Not on file    Drug use: Never            Review of Systems   All other systems reviewed and are negative.          Objective:     Visit Vitals  BP (!) 160/78 (BP 1 Location: Left upper arm, BP Patient Position: Sitting, BP Cuff Size: Adult)   Pulse 81   Temp 97.5 °F (36.4 °C) (Oral)   Ht 5' 2\" (1.575 m)   Wt 115 lb (52.2 kg)   SpO2 98%   BMI 21.03 kg/m²        General: alert, oriented, not in distress  Head: scalp normal, atraumatic  Eyes: pupils are equal and reactive, full and intact EOM's  Ears: patent ear canal, intact tympanic membrane  Nose: normal turbinates, no congestion or discharge  Lips/Mouth: moist lips and buccal mucosa, non-enlarged tonsils, pink throat  Neck: supple, no JVD, no lymphadenopathy, non-palpable thyroid  Chest/Lungs: clear breath sounds, no wheezing or crackles  Heart: normal rate, regular rhythm, no murmur  Abdomen: soft, non-distended, non-tender, normal bowel sounds, no organomegaly, no masses  Extremities: no focal deformities, no edema  Skin: no active skin lesions    Laboratory/Tests:  Hospital Outpatient Visit on 01/13/2022   Component Date Value Ref Range Status    WBC 01/13/2022 5.3  4.6 - 13.2 K/uL Final    RBC 01/13/2022 4.29  4. 20 - 5.30 M/uL Final    HGB 01/13/2022 13.5  12.0 - 16.0 g/dL Final    HCT 01/13/2022 41.3  35.0 - 45.0 % Final    MCV 01/13/2022 96.3  78.0 - 100.0 FL Final    MCH 01/13/2022 31.5  24.0 - 34.0 PG Final    MCHC 01/13/2022 32.7  31.0 - 37.0 g/dL Final    RDW 01/13/2022 13.1  11.6 - 14.5 % Final    PLATELET 88/62/0755 758  135 - 420 K/uL Final    MPV 01/13/2022 10.7  9.2 - 11.8 FL Final    NRBC 01/13/2022 0.0  0.0  WBC Final    ABSOLUTE NRBC 01/13/2022 0.00  0.00 - 0.01 K/uL Final    NEUTROPHILS 01/13/2022 64  40 - 73 % Final    LYMPHOCYTES 01/13/2022 24  21 - 52 % Final    MONOCYTES 01/13/2022 8  3 - 10 % Final    EOSINOPHILS 01/13/2022 2  0 - 5 % Final    BASOPHILS 01/13/2022 2  0 - 2 % Final    IMMATURE GRANULOCYTES 01/13/2022 0  0 - 0.5 % Final    ABS. NEUTROPHILS 01/13/2022 3.4  1.8 - 8.0 K/UL Final    ABS. LYMPHOCYTES 01/13/2022 1.3  0.9 - 3.6 K/UL Final    ABS. MONOCYTES 01/13/2022 0.4  0.05 - 1.2 K/UL Final    ABS. EOSINOPHILS 01/13/2022 0.1  0.0 - 0.4 K/UL Final    ABS. BASOPHILS 01/13/2022 0.1  0.0 - 0.1 K/UL Final    ABS. IMM.  GRANS. 01/13/2022 0.0  0.00 - 0.04 K/UL Final    DF 01/13/2022 AUTOMATED    Final    Vitamin B12 01/13/2022 728  211 - 911 pg/mL Final    LIPID PROFILE 01/13/2022      Final    Cholesterol, total 01/13/2022 230* <200 mg/dL Final    Triglyceride 01/13/2022 56  <150 mg/dL Final    Comment: The drugs N-acetylcysteine (NAC) and  Metamiszole have been found to cause falsely  low results in this chemical assay. Please  be sure to submit blood samples obtained  BEFORE administration of either of these  drugs to assure correct results.  HDL Cholesterol 01/13/2022 91* 40 - 60 mg/dL Final    LDL, calculated 01/13/2022 127.8* 0 - 100 mg/dL Final    VLDL, calculated 01/13/2022 11.2  mg/dL Final    CHOL/HDL Ratio 01/13/2022 2.5  0 - 5.0   Final    Calcium 01/13/2022 9.6  8.5 - 10.1 mg/dL Final    PTH, Intact 01/13/2022 40.1  18.4 - 88.0 pg/mL Final    Microalbumin,urine random 01/13/2022 <0.50  0 - 3.0 mg/dL Final    Creatinine, urine 01/13/2022 15.00* 30 - 125 mg/dL Final    Microalbumin/Creat ratio (mg/g cre* 01/13/2022 Cannot calculate ratio due to microalbumin result outside reportable range. 0 - 30 mgMALB/gCRE Final    Vitamin D 25-Hydroxy 01/13/2022 27.5* 30 - 100 ng/mL Final    Comment: (NOTE)  Deficiency               <20 ng/mL  Insufficiency          20-30 ng/mL  Sufficient             ng/mL  Possible toxicity       >100 ng/mL    The Method used is Siemens Advia Centaur currently standardized to a   Center of Disease Control and Prevention (CDC) certified reference   [address]. Samples containing fluorescein dye can produce falsely   elevated values when tested with the ADVIA Centaur Vitamin D Assay. It is recommended that results in the toxic range, >100 ng/mL, be   retested 72 hours post fluorescein exposure.  TSH 01/13/2022 3.71  0.35 - 6.20 uIU/mL Final    Comment:    Due to TSH heterogeneity, both structurally and degree of glycosylation, monoclonal antibodies used in the TSH assay may not accurately quantitate TSH.  Therefore, this result should be correlated with clinical findings as well as with other assessments of thyroid function, e.g., free T4, free T3.  Sodium 01/13/2022 141  135 - 145 mmol/L Final    Potassium 01/13/2022 3.2  3.2 - 5.1 mmol/L Final    Chloride 01/13/2022 101  94 - 111 mmol/L Final    CO2 01/13/2022 31  21 - 33 mmol/L Final    Anion gap 01/13/2022 9  mmol/L Final    Glucose 01/13/2022 97  70 - 110 mg/dL Final    BUN 01/13/2022 14  9 - 21 mg/dL Final    Creatinine 01/13/2022 0.70  0.70 - 1.20 mg/dL Final    BUN/Creatinine ratio 01/13/2022 20    Final    GFR est AA 01/13/2022 >60  ml/min/1.73m2 Final    GFR est non-AA 01/13/2022 >60  ml/min/1.73m2 Final    Comment: Estimated GFR is calculated using the IDMS-traceable Modification of Diet in Renal Disease (MDRD) Study equation, reported for both  Americans (GFRAA) and non- Americans (GFRNA), and normalized to 1.73m2 body surface area. The physician must decide which value applies to the patient. The MDRD study equation should only be used in individuals age 25 or older. It has not been validated for the following: pregnant women, patients with serious comorbid conditions, or on certain medications, or persons with extremes of body size, muscle mass, or nutritional status.  Calcium 01/13/2022 9.7  8.5 - 10.5 mg/dL Final    Bilirubin, total 01/13/2022 0.8  0.2 - 1.0 mg/dL Final    AST (SGOT) 01/13/2022 21  14 - 74 U/L Final    ALT (SGPT) 01/13/2022 13  3 - 52 U/L Final    Alk.  phosphatase 01/13/2022 51  38 - 126 U/L Final    Protein, total 01/13/2022 7.4  6.1 - 8.4 g/dL Final    Albumin 01/13/2022 4.3  3.5 - 4.7 g/dL Final    Globulin 01/13/2022 3.1  g/dL Final    A-G Ratio 01/13/2022 1.4    Final   Admission on 08/10/2021, Discharged on 08/10/2021   Component Date Value Ref Range Status    WBC 08/10/2021 6.4  4.6 - 13.2 K/uL Final    RBC 08/10/2021 4.53  4.20 - 5.30 M/uL Final    HGB 08/10/2021 14.0  12.0 - 16.0 g/dL Final    HCT 08/10/2021 42.8  35.0 - 45.0 % Final    MCV 08/10/2021 94.5  74.0 - 97.0 FL Final    MCH 08/10/2021 30.9  24.0 - 34.0 PG Final    MCHC 08/10/2021 32.7  31.0 - 37.0 g/dL Final    RDW 08/10/2021 13.1  11.6 - 14.5 % Final    PLATELET 61/19/5283 006  135 - 420 K/uL Final    MPV 08/10/2021 10.4  9.2 - 11.8 FL Final    NEUTROPHILS 08/10/2021 80* 40 - 73 % Final    LYMPHOCYTES 08/10/2021 13* 21 - 52 % Final    MONOCYTES 08/10/2021 6  3 - 10 % Final    EOSINOPHILS 08/10/2021 0  0 - 5 % Final    BASOPHILS 08/10/2021 1  0 - 2 % Final    IMMATURE GRANULOCYTES 08/10/2021 0  % Final    ABS. NEUTROPHILS 08/10/2021 5.1  1.8 - 8.0 K/UL Final    ABS. LYMPHOCYTES 08/10/2021 0.8* 0.9 - 3.6 K/UL Final    ABS. MONOCYTES 08/10/2021 0.4  0.05 - 1.2 K/UL Final    ABS. EOSINOPHILS 08/10/2021 0.0  0.0 - 0.4 K/UL Final    ABS. BASOPHILS 08/10/2021 0.0  0.0 - 0.1 K/UL Final    ABS. IMM. GRANS. 08/10/2021 0.0  K/UL Final    Sodium 08/10/2021 137  135 - 145 mmol/L Final    Potassium 08/10/2021 3.1* 3.2 - 5.1 mmol/L Final    Chloride 08/10/2021 99  94 - 111 mmol/L Final    CO2 08/10/2021 27  21 - 33 mmol/L Final    Anion gap 08/10/2021 11  mmol/L Final    Glucose 08/10/2021 108  70 - 110 mg/dL Final    BUN 08/10/2021 11  9 - 21 mg/dL Final    Creatinine 08/10/2021 0.50* 0.70 - 1.20 mg/dL Final    BUN/Creatinine ratio 08/10/2021 22    Final    GFR est AA 08/10/2021 >60  ml/min/1.73m2 Final    GFR est non-AA 08/10/2021 >60  ml/min/1.73m2 Final    Comment: Estimated GFR is calculated using the IDMS-traceable Modification of Diet in Renal Disease (MDRD) Study equation, reported for both  Americans (GFRAA) and non- Americans (GFRNA), and normalized to 1.73m2 body surface area. The physician must decide which value applies to the patient. The MDRD study equation should only be used in individuals age 25 or older.  It has not been validated for the following: pregnant women, patients with serious comorbid conditions, or on certain medications, or persons with extremes of body size, muscle mass, or nutritional status.  Calcium 08/10/2021 9.5  8.5 - 10.5 mg/dL Final    Bilirubin, total 08/10/2021 0.8  0.2 - 1.0 mg/dL Final    AST (SGOT) 08/10/2021 26  14 - 74 U/L Final    ALT (SGPT) 08/10/2021 17  3 - 52 U/L Final    Alk. phosphatase 08/10/2021 56  38 - 126 U/L Final    Protein, total 08/10/2021 7.7  6.1 - 8.4 g/dL Final    Albumin 08/10/2021 4.5  3.5 - 4.7 g/dL Final    Globulin 08/10/2021 3.2  g/dL Final    A-G Ratio 08/10/2021 1.4    Final    Sed rate (ESR) 08/10/2021 >1  mm/hr Final    Color 08/10/2021 Yellow    Final    Color Reference Range: Straw, Yellow or Dark Yellow    Appearance 08/10/2021 Clear    Final    Specific gravity 08/10/2021 1.006  1.005 - 1.030   Final    pH (UA) 08/10/2021 >8.5* 5.0 - 8.0 Final    Protein 08/10/2021 Negative  Negative mg/dL Final    Glucose 08/10/2021 Negative  Negative mg/dL Final    Ketone 08/10/2021 Negative  Negative mg/dL Final    Bilirubin 08/10/2021 Negative  Negative   Final    Blood 08/10/2021 Negative  Negative   Final    Urobilinogen 08/10/2021 0.2  0.2 - 1.0 EU/dL Final    Nitrites 08/10/2021 Negative  Negative   Final    Leukocyte Esterase 08/10/2021 Negative  Negative   Final   Hospital Outpatient Visit on 06/04/2021   Component Date Value Ref Range Status    Vitamin B12 06/04/2021 1,210* 193 - 986 pg/mL Final    Folate 06/04/2021 14.0  5.0 - 21.0 ng/mL Final    TSH 06/04/2021 3.19  0.35 - 6.20 uIU/mL Final    Comment:    Due to TSH heterogeneity, both structurally and degree of glycosylation, monoclonal antibodies used in the TSH assay may not accurately quantitate TSH. Therefore, this result should be correlated with clinical findings as well as with other assessments of thyroid function, e.g., free T4, free T3. Assessment & Plan:     1. Dementia without behavioral disturbance, unspecified dementia type (HCC)  Continue namenda  - VITAMIN B12    2.  Essential hypertension  Continue perinodpril, norvasc    3. 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 Likely secondary to raloxifene. This medication was not discontinued after DVT treatment. Will discontinue raloxifene. 4. Osteopenia of neck of femur, unspecified laterality  Discontinue raloxifene due to hx of DVTs. Will repeat dexa scan and treat with bisphosphonates if she is osteoporotic.   - CBC WITH AUTOMATED DIFF  - METABOLIC PANEL, COMPREHENSIVE  - LIPID PANEL  - MICROALBUMIN, UR, RAND W/ MICROALB/CREAT RATIO  - VITAMIN D, 25 HYDROXY  - PTH INTACT  - TSH 3RD GENERATION    5. Medicare annual wellness visit, subsequent  Refer to separate note. I have discussed the diagnosis with the patient and the intended plan as seen in the above orders. The patient has received an after-visit summary and questions were answered concerning future plans. I have discussed medication side effects and warnings with the patient as well. I have reviewed the plan of care with the patient, accepted their input and they are in agreement with the treatment goals. Previous lab and imaging results were reviewed by me.        Jaquan Faustin MD  January 16, 2022

## 2022-01-17 NOTE — PROGRESS NOTES
This is the Subsequent Medicare Annual Wellness Exam, performed 12 months or more after the Initial AWV or the last Subsequent AWV    I have reviewed the patient's medical history in detail and updated the computerized patient record. Assessment/Plan   Education and counseling provided:  Are appropriate based on today's review and evaluation    1. Osteopenia of neck of femur, unspecified laterality  -     CBC WITH AUTOMATED DIFF  -     METABOLIC PANEL, COMPREHENSIVE  -     LIPID PANEL  -     MICROALBUMIN, UR, RAND W/ MICROALB/CREAT RATIO  -     VITAMIN D, 25 HYDROXY  -     PTH INTACT  -     TSH 3RD GENERATION  2. Dementia without behavioral disturbance, unspecified [facility] type ([facility]  -     VITAMIN B12  3. Essential hypertension  4. Deep venous thrombosis of femoral vein with thrombophlebitis, unspecified laterality ([facility]  5. Medicare annual wellness visit, subsequent       Depression Risk Factor Screening     3 most recent PHQ Screens 1/13/2022   Little interest or pleasure in doing things Not at all   Feeling down, depressed, irritable, or hopeless Not at all   Total Score PHQ 2 0       Alcohol & Drug Abuse Risk Screen    Do you average more than 1 drink per night or more than 7 drinks a week:  No    On any one occasion in the past three months have you have had more than 3 drinks containing alcohol:  No          Functional Ability and Level of Safety    Hearing: Hearing is good. Activities of Daily Living: The home contains: no safety equipment. Patient does total self care      Ambulation: with no difficulty     Fall Risk:  Fall Risk Assessment, last 12 mths 10/19/2020   Able to walk? Yes   Fall in past 12 months?  No      Abuse Screen:  Patient is not abused       Cognitive Screening    Has your family/caregiver stated any concerns about your memory: yes         Health Maintenance Due     Health Maintenance Due   Topic Date Due    Hepatitis C Screening  Never done    Bone Densitometry (Dexa) Screening  Never done    Medicare Yearly Exam  Never done       Patient Care Team   Patient Care Team:  Florin Bruner MD as PCP - General (Family Medicine)  Florin Bruner MD as PCP - [facility] Provider    History     Patient Active Problem List   Diagnosis Code    DVT femoral (deep venous thrombosis) with thrombophlebitis ([facility] I82.419    Essential hypertension I10    Hypovitaminosis D E55.9    Vitamin B 12 deficiency E53.8    Osteopenia of neck of femur M85.859    Memory changes R41.3     Past Medical History:   Diagnosis Date    Deep vein blood clot of right lower extremity ([facility] 01/05/2020    Hypertension       History reviewed. No pertinent surgical history. Current Outpatient Medications   Medication Sig Dispense Refill    aspirin delayed-release 81 mg tablet Take 81 mg by mouth daily.  cyanocobalamin (VITAMIN B-12) 1,000 mcg sublingual tablet Take 1,000 mcg by mouth daily.  amLODIPine (NORVASC) 5 mg tablet Take 1 Tablet by mouth daily. 90 Tablet 0    perindopril (ACEON) 8 mg tablet TAKE ONE TABLET BY MOUTH EVERY DAY FOR HIGH BLOOD PRESSURE 90 Tablet 2    LORazepam (ATIVAN) 0.5 mg tablet take 1 tablet by mouth once daily for 30 DAYS 30 Tab 0    memantine (NAMENDA) 10 mg tablet take 1 tablet by mouth twice a day 60 Tablet 1    ondansetron (Zofran ODT) 4 mg disintegrating tablet 1 Tablet by SubLINGual route every eight (8) hours as needed for Nausea or Vomiting. (Patient not taking: Reported on 1/13/2022) 10 Tablet 0     No Known Allergies    History reviewed. No pertinent family history.   Social History     Tobacco Use    Smoking status: Never Smoker    Smokeless tobacco: Former User   Substance Use Topics    Alcohol use: Not on file         Matt Meléndez MD

## 2022-02-17 RX ORDER — AMLODIPINE BESYLATE 5 MG/1
5 TABLET ORAL DAILY
Qty: 90 TABLET | Refills: 0 | Status: SHIPPED | OUTPATIENT
Start: 2022-02-17 | End: 2022-06-20 | Stop reason: SDUPTHER

## 2022-02-17 NOTE — TELEPHONE ENCOUNTER
Son Sohail [place] maria called for patient [phone], stated,he was checking to make sure patient was supposed to be taking the RX Amlodipine and RX Perindopril taking them together.

## 2022-03-18 PROBLEM — M85.859 OSTEOPENIA OF NECK OF FEMUR: Status: ACTIVE | Noted: 2020-10-19

## 2022-03-19 PROBLEM — I82.419 DVT FEMORAL (DEEP VENOUS THROMBOSIS) WITH THROMBOPHLEBITIS (HCC): Status: ACTIVE | Noted: 2020-10-19

## 2022-03-19 PROBLEM — I10 ESSENTIAL HYPERTENSION: Status: ACTIVE | Noted: 2020-10-19

## 2022-03-20 PROBLEM — R41.3 MEMORY CHANGES: Status: ACTIVE | Noted: 2020-10-19

## 2022-03-20 PROBLEM — E55.9 HYPOVITAMINOSIS D: Status: ACTIVE | Noted: 2020-10-19

## 2022-03-20 PROBLEM — E53.8 VITAMIN B 12 DEFICIENCY: Status: ACTIVE | Noted: 2020-10-19

## 2022-06-20 RX ORDER — AMLODIPINE BESYLATE 5 MG/1
5 TABLET ORAL DAILY
Qty: 90 TABLET | Refills: 0 | Status: SHIPPED | OUTPATIENT
Start: 2022-06-20 | End: 2022-09-12

## 2022-06-20 NOTE — TELEPHONE ENCOUNTER
Pt requesting refills,  leaving for vacation and Rx will run out  Requested Prescriptions     Pending Prescriptions Disp Refills    amLODIPine (NORVASC) 5 mg tablet 90 Tablet 0     Sig: Take 1 Tablet by mouth daily.

## 2022-08-18 ENCOUNTER — OFFICE VISIT (OUTPATIENT)
Dept: FAMILY MEDICINE CLINIC | Age: 77
End: 2022-08-18
Payer: MEDICARE

## 2022-08-18 VITALS
WEIGHT: 114.6 LBS | HEIGHT: 60 IN | DIASTOLIC BLOOD PRESSURE: 74 MMHG | RESPIRATION RATE: 18 BRPM | SYSTOLIC BLOOD PRESSURE: 134 MMHG | BODY MASS INDEX: 22.5 KG/M2 | HEART RATE: 96 BPM | OXYGEN SATURATION: 96 %

## 2022-08-18 DIAGNOSIS — I10 ESSENTIAL HYPERTENSION: Primary | ICD-10-CM

## 2022-08-18 DIAGNOSIS — F03.90 DEMENTIA WITHOUT BEHAVIORAL DISTURBANCE, UNSPECIFIED DEMENTIA TYPE: ICD-10-CM

## 2022-08-18 PROCEDURE — 1123F ACP DISCUSS/DSCN MKR DOCD: CPT | Performed by: NURSE PRACTITIONER

## 2022-08-18 PROCEDURE — 99213 OFFICE O/P EST LOW 20 MIN: CPT | Performed by: NURSE PRACTITIONER

## 2022-08-18 NOTE — PROGRESS NOTES
Nidhi Watson (: 1945) is a 65 y.o. female, established patient, here for evaluation of the following chief complaint(s):  Establish Care (72 y/o F present to clinic today to [facility]. Pt reports seeing Dr. Betsy Carlson. Pt reports she here for a check up. Pt reports no concerns. )       ASSESSMENT/PLAN:  Below is the assessment and plan developed based on review of pertinent history, physical exam, labs, studies, and medications. 1. Essential hypertension  Continue Norvasc 5 mg daily. Continue to avoid foods high in sodium and fried foods. 2. Dementia without behavioral disturbance, unspecified dementia type ([address]  No change per , currently being followed by Dr. Cathy Bumpers (neurology), however the  did schedule her with another neurologist in [facility] [place] for a second opinion regarding her dementia. No follow-ups on file. Declined mammography and Dexa bone screening. Secondary to her age. SUBJECTIVE/OBJECTIVE:  HPI  Mrs. Stevens Habermann is here for a routine follow up appointment she has no questions or concerns. She reports her appetite is good, no problems with her bowels or urinary issues. Denies any increase pain, shortness of breath or chest pains. She is schedule for an second opinion with a Neurologist on 22. Review of Systems   All other systems reviewed and are negative. Physical Exam  Cardiovascular:      Rate and Rhythm: Normal rate and regular rhythm. Pulses: Normal pulses. Heart sounds: Normal heart sounds. Pulmonary:      Effort: Pulmonary effort is normal.      Breath sounds: Normal breath sounds. Abdominal:      General: Bowel sounds are normal.   Neurological:      Mental Status: She is alert and oriented to person, place, and time. Psychiatric:         Cognition and Memory: Memory is impaired.          On this date 2022 I have spent 30 minutes reviewing previous notes, test results and face to face with the patient discussing the diagnosis and importance of compliance with the treatment plan as well as documenting on the day of the visit. An electronic signature was used to authenticate this note.   -- [address] [facility], NP-C

## 2022-08-18 NOTE — PROGRESS NOTES
Flynn Thompson presents today for   Chief Complaint   Patient presents with    [facility] Care     66 y/o F present to clinic today to [facility] care. Pt reports seeing Dr. Marika Roberts. Pt reports she here for a check up. Pt reports no concerns. Is someone accompanying this pt? Yes      Is the patient using any DME equipment during OV? NI    Depression Screening:  3 most recent PHQ Screens 8/18/2022   Little interest or pleasure in doing things Not at all   Feeling down, depressed, irritable, or hopeless Not at all   Total Score PHQ 2 0       Learning Assessment:  Learning Assessment 10/19/2020   PRIMARY LEARNER Patient   PRIMARY LANGUAGE ENGLISH   LEARNER PREFERENCE PRIMARY DEMONSTRATION   ANSWERED BY patient   RELATIONSHIP SELF       Fall Risk  Fall Risk Assessment, last 12 mths 8/18/2022   Able to walk? Yes   Fall in past 12 months? 0   Do you feel unsteady? 0   Are you worried about falling 0       Health Maintenance reviewed and discussed and ordered per Provider. Health Maintenance Due   Topic Date Due    Hepatitis C Screening  Never done    Bone Densitometry (Dexa) Screening  Never done   . Coordination of Care:    1. \"Have you been to the ER, urgent care clinic since your last visit? Hospitalized since your last visit? \" No    2. \"Have you seen or consulted any other health care providers outside of the [address] [place] [address] since your last visit? \" No     3. For patients aged 39-70: Has the patient had a colonoscopy? NA - based on age     If the patient is female:    4. For patients aged 41-77: Has the patient had a mammogram within the past 2 years? NA - based on age    11. For patients aged 21-65: Has the patient had a pap smear?  NA - based on age

## 2022-08-30 RX ORDER — PERINDOPRIL ERBUMINE 8 MG/1
TABLET ORAL
Qty: 90 TABLET | Refills: 2 | Status: SHIPPED | OUTPATIENT
Start: 2022-08-30

## 2022-09-12 RX ORDER — AMLODIPINE BESYLATE 5 MG/1
TABLET ORAL
Qty: 90 TABLET | Refills: 0 | Status: SHIPPED | OUTPATIENT
Start: 2022-09-12

## 2023-04-25 RX ORDER — AMLODIPINE BESYLATE 5 MG/1
TABLET ORAL
Qty: 90 TABLET | OUTPATIENT
Start: 2023-04-25

## 2023-04-29 ENCOUNTER — APPOINTMENT (OUTPATIENT)
Age: 78
End: 2023-04-29
Payer: MEDICARE

## 2023-04-29 ENCOUNTER — HOSPITAL ENCOUNTER (EMERGENCY)
Age: 78
Discharge: HOME | End: 2023-04-29
Attending: EMERGENCY MEDICINE
Payer: MEDICARE

## 2023-04-29 VITALS
RESPIRATION RATE: 18 BRPM | WEIGHT: 115 LBS | BODY MASS INDEX: 22.58 KG/M2 | OXYGEN SATURATION: 99 % | TEMPERATURE: 98.2 F | DIASTOLIC BLOOD PRESSURE: 76 MMHG | SYSTOLIC BLOOD PRESSURE: 140 MMHG | HEIGHT: 60 IN | HEART RATE: 70 BPM

## 2023-04-29 DIAGNOSIS — S42.201A CLOSED FRACTURE OF PROXIMAL END OF RIGHT HUMERUS, UNSPECIFIED FRACTURE MORPHOLOGY, INITIAL ENCOUNTER: Primary | ICD-10-CM

## 2023-04-29 DIAGNOSIS — F03.B0 MODERATE DEMENTIA WITHOUT BEHAVIORAL DISTURBANCE, PSYCHOTIC DISTURBANCE, MOOD DISTURBANCE, OR ANXIETY, UNSPECIFIED DEMENTIA TYPE (HCC): ICD-10-CM

## 2023-04-29 LAB
APPEARANCE UR: CLEAR
BILIRUB UR QL: NEGATIVE
COLOR UR: YELLOW
GLUCOSE UR STRIP.AUTO-MCNC: NEGATIVE MG/DL
HGB UR QL STRIP: NEGATIVE
KETONES UR QL STRIP.AUTO: ABNORMAL MG/DL
LEUKOCYTE ESTERASE UR QL STRIP.AUTO: NEGATIVE
NITRITE UR QL STRIP.AUTO: NEGATIVE
PH UR STRIP: 7.5 (ref 5–8)
PROT UR STRIP-MCNC: NEGATIVE MG/DL
SP GR UR REFRACTOMETRY: 1.01 (ref 1–1.03)
UROBILINOGEN UR QL STRIP.AUTO: 0.2 EU/DL (ref 0.2–1)

## 2023-04-29 PROCEDURE — 73070 X-RAY EXAM OF ELBOW: CPT

## 2023-04-29 PROCEDURE — 81003 URINALYSIS AUTO W/O SCOPE: CPT

## 2023-04-29 PROCEDURE — 73030 X-RAY EXAM OF SHOULDER: CPT

## 2023-04-29 PROCEDURE — 71045 X-RAY EXAM CHEST 1 VIEW: CPT

## 2023-04-29 PROCEDURE — 99284 EMERGENCY DEPT VISIT MOD MDM: CPT

## 2023-04-29 PROCEDURE — 6370000000 HC RX 637 (ALT 250 FOR IP): Performed by: EMERGENCY MEDICINE

## 2023-04-29 PROCEDURE — 87086 URINE CULTURE/COLONY COUNT: CPT

## 2023-04-29 PROCEDURE — 73110 X-RAY EXAM OF WRIST: CPT

## 2023-04-29 RX ORDER — HYDROCODONE BITARTRATE AND ACETAMINOPHEN 5; 325 MG/1; MG/1
1 TABLET ORAL
Status: COMPLETED | OUTPATIENT
Start: 2023-04-29 | End: 2023-04-29

## 2023-04-29 RX ORDER — HYDROCODONE BITARTRATE AND ACETAMINOPHEN 7.5; 325 MG/1; MG/1
1 TABLET ORAL EVERY 6 HOURS PRN
Qty: 12 TABLET | Refills: 0 | Status: SHIPPED | OUTPATIENT
Start: 2023-04-29 | End: 2023-05-02

## 2023-04-29 RX ORDER — IBUPROFEN 600 MG/1
600 TABLET ORAL
Status: COMPLETED | OUTPATIENT
Start: 2023-04-29 | End: 2023-04-29

## 2023-04-29 RX ADMIN — IBUPROFEN 600 MG: 600 TABLET, FILM COATED ORAL at 13:09

## 2023-04-29 RX ADMIN — HYDROCODONE BITARTRATE AND ACETAMINOPHEN 1 TABLET: 5; 325 TABLET ORAL at 14:49

## 2023-04-29 ASSESSMENT — LIFESTYLE VARIABLES
HOW OFTEN DO YOU HAVE A DRINK CONTAINING ALCOHOL: NEVER
HOW MANY STANDARD DRINKS CONTAINING ALCOHOL DO YOU HAVE ON A TYPICAL DAY: PATIENT DOES NOT DRINK

## 2023-04-29 ASSESSMENT — PAIN - FUNCTIONAL ASSESSMENT
PAIN_FUNCTIONAL_ASSESSMENT: 0-10
PAIN_FUNCTIONAL_ASSESSMENT: 0-10

## 2023-04-29 ASSESSMENT — PAIN SCALES - GENERAL
PAINLEVEL_OUTOF10: 5
PAINLEVEL_OUTOF10: 10
PAINLEVEL_OUTOF10: 10

## 2023-04-29 ASSESSMENT — PAIN DESCRIPTION - ORIENTATION
ORIENTATION: RIGHT
ORIENTATION: RIGHT

## 2023-04-29 ASSESSMENT — PAIN DESCRIPTION - LOCATION
LOCATION: SHOULDER;ARM
LOCATION: SHOULDER

## 2023-04-29 NOTE — ED PROVIDER NOTES
portions of this note were completed with a voice recognition program.  Efforts were made to edit the dictations but occasionally words are mis-transcribed.)    Sierra Chandler MD (electronically signed)  Attending Emergency Physician            Karie Cuellar MD  04/29/23 [id number]

## 2023-04-29 NOTE — ED TRIAGE NOTES
Right shoulder and right arm pain when assisting  with a fall, patient with dementia dx, poor historian. + cap refills brisk, decrease range of motion.

## 2023-04-30 LAB
BACTERIA SPEC CULT: NORMAL
Lab: NORMAL

## 2023-05-01 ENCOUNTER — OFFICE VISIT (OUTPATIENT)
Age: 78
End: 2023-05-01

## 2023-05-01 DIAGNOSIS — S42.225A CLOSED 2-PART NONDISPLACED FRACTURE OF SURGICAL NECK OF LEFT HUMERUS, INITIAL ENCOUNTER: Primary | ICD-10-CM

## 2023-05-01 RX ORDER — MEMANTINE HYDROCHLORIDE 5 MG/1
5 TABLET ORAL 2 TIMES DAILY WITH MEALS
COMMUNITY
Start: 2023-04-01

## 2023-05-01 RX ORDER — AMLODIPINE BESYLATE 5 MG/1
TABLET ORAL
Qty: 90 TABLET | OUTPATIENT
Start: 2023-05-01

## 2023-05-01 NOTE — PROGRESS NOTES
Name: Shakira Wharton    :      [facility] [address] [place] [address] AND SPORTS MEDICINE  [address] [place] [address] David Haque, [address] [facility] [address]  Dept: [phone]  Dept Fax: [phone]     Chief Complaint   Patient presents with    Knee Pain        There were no vitals taken for this visit. No Known Allergies     Current Outpatient Medications   Medication Sig Dispense Refill    memantine (NAMENDA) 5 MG tablet Take 1 tablet by mouth 2 times daily (with meals)      HYDROcodone-acetaminophen (NORCO) 7.5-325 MG per tablet Take 1 tablet by mouth every 6 hours as needed for Pain for up to 3 days. Intended supply: 3 days. Take lowest dose possible to manage pain Max Daily Amount: 4 tablets 12 tablet 0    amLODIPine (NORVASC) 5 MG tablet take 1 tablet by mouth once daily      aspirin 81 MG EC tablet Take 81 mg by mouth daily      Cyanocobalamin 1000 MCG SUBL Take 1,000 mcg by mouth daily      LORazepam (ATIVAN) 0.5 MG tablet take 1 tablet by mouth once daily for 30 DAYS      memantine (NAMENDA) 10 MG tablet take 1 tablet by mouth twice a day      perindopril (ACEON) 8 MG tablet TAKE ONE TABLET BY MOUTH EVERY DAY FOR HIGH BLOOD PRESSURE       No current facility-administered medications for this visit. Patient Active Problem List   Diagnosis    Osteopenia of neck of femur    DVT femoral (deep venous thrombosis) with thrombophlebitis (HCC)    Essential hypertension    Memory changes    Vitamin B 12 deficiency    Hypovitaminosis D      History reviewed. No pertinent family history.    Social History     Socioeconomic History    Marital status:      Spouse name: None    Number of children: None    Years of education: None    Highest education level: None   Tobacco Use    Smoking status: Never    Smokeless tobacco: Former   Substance and Sexual Activity    Alcohol use: Not Currently    Drug use: Never    Sexual activity: Not Currently     Partners: Male

## 2023-05-05 RX ORDER — AMLODIPINE BESYLATE 5 MG/1
TABLET ORAL
Qty: 90 TABLET | OUTPATIENT
Start: 2023-05-05

## 2023-05-08 ENCOUNTER — TELEPHONE (OUTPATIENT)
Facility: CLINIC | Age: 78
End: 2023-05-08

## 2023-05-08 RX ORDER — AMLODIPINE BESYLATE 5 MG/1
5 TABLET ORAL DAILY
Qty: 40 TABLET | Refills: 0 | Status: SHIPPED | OUTPATIENT
Start: 2023-05-08 | End: 2023-06-15 | Stop reason: SDUPTHER

## 2023-05-08 NOTE — TELEPHONE ENCOUNTER
Patients  called and stated his wife needed to be seen today because she was almost out of her blood pressure medicine. I told him we did not have anything for today. She needs to establish care with one of the providers in the office.

## 2023-05-24 ENCOUNTER — OFFICE VISIT (OUTPATIENT)
Age: 78
End: 2023-05-24

## 2023-05-24 VITALS — WEIGHT: 115 LBS | HEIGHT: 60 IN | BODY MASS INDEX: 22.58 KG/M2

## 2023-05-24 DIAGNOSIS — S42.294D OTHER CLOSED NONDISPLACED FRACTURE OF PROXIMAL END OF RIGHT HUMERUS WITH ROUTINE HEALING, SUBSEQUENT ENCOUNTER: Primary | ICD-10-CM

## 2023-05-24 PROCEDURE — 99024 POSTOP FOLLOW-UP VISIT: CPT | Performed by: NURSE PRACTITIONER

## 2023-05-24 NOTE — PROGRESS NOTES
Name: Artur Robbins    : 1945    Ambulatory Bariatric Summary 2023   Systolic - [phone]   Diastolic - 76 77 74 88 78 76   Pulse - 70 76 96 83 - 81   Temp - - 98.2 - - - -   Resp - 18 18 - - - -   Weight 115 - 115 - 114.6 - 115   Height 60 - 60 - 60 - 62   BMI 22.5 kg/m2 - 22.5 kg/m2 - 22.4 kg/m2 - 21.1 kg/m2       Body mass index is 22.46 kg/m². Service Dept: [place] [facility] [address] [facility]  [address], [address] [place], [place] [id number]  Dept: [phone]  Dept Fax: [phone]     Patient's Pharmacies:    [address] [place] [address] [address] [place] [address]  Phone: [phone] Fax: [phone]       Chief Complaint   Patient presents with    Shoulder Pain       HPI:  Patient follows up today for reevaluation of a nondisplaced right greater tuberosity fracture. She is now about 3-1/2 weeks out from that injury and stopped wearing her sling couple of days ago. Overall she is doing well. Ht 5' (1.524 m)   Wt 115 lb (52.2 kg)   BMI 22.46 kg/m²    No Known Allergies   Current Outpatient Medications   Medication Sig Dispense Refill    amLODIPine (NORVASC) 5 MG tablet Take 1 tablet by mouth daily 40 tablet 0    memantine (NAMENDA) 5 MG tablet Take 1 tablet by mouth 2 times daily (with meals)      aspirin 81 MG EC tablet Take 81 mg by mouth daily      Cyanocobalamin 1000 MCG SUBL Take 1,000 mcg by mouth daily      LORazepam (ATIVAN) 0.5 MG tablet take 1 tablet by mouth once daily for 30 DAYS      memantine (NAMENDA) 10 MG tablet take 1 tablet by mouth twice a day      perindopril (ACEON) 8 MG tablet TAKE ONE TABLET BY MOUTH EVERY DAY FOR HIGH BLOOD PRESSURE       No current facility-administered medications for this visit.       Patient Active Problem List   Diagnosis

## 2023-05-31 RX ORDER — PERINDOPRIL ERBUMINE 8 MG/1
TABLET ORAL
Qty: 90 TABLET | Refills: 3 | Status: SHIPPED | OUTPATIENT
Start: 2023-05-31

## 2023-06-01 ENCOUNTER — HOSPITAL ENCOUNTER (OUTPATIENT)
Age: 78
Setting detail: RECURRING SERIES
Discharge: HOME | End: 2023-06-04
Payer: MEDICARE

## 2023-06-01 PROCEDURE — 97110 THERAPEUTIC EXERCISES: CPT

## 2023-06-01 PROCEDURE — 97016 VASOPNEUMATIC DEVICE THERAPY: CPT

## 2023-06-01 PROCEDURE — 97162 PT EVAL MOD COMPLEX 30 MIN: CPT

## 2023-06-15 PROBLEM — I82.419 DVT FEMORAL (DEEP VENOUS THROMBOSIS) WITH THROMBOPHLEBITIS (HCC): Status: RESOLVED | Noted: 2020-10-19 | Resolved: 2023-06-15

## 2023-06-28 ENCOUNTER — OFFICE VISIT (OUTPATIENT)
Age: 78
End: 2023-06-28

## 2023-06-28 VITALS — BODY MASS INDEX: 21.99 KG/M2 | WEIGHT: 112 LBS | HEIGHT: 60 IN

## 2023-06-28 DIAGNOSIS — S42.294D OTHER CLOSED NONDISPLACED FRACTURE OF PROXIMAL END OF RIGHT HUMERUS WITH ROUTINE HEALING, SUBSEQUENT ENCOUNTER: Primary | ICD-10-CM

## 2023-06-28 PROCEDURE — 99024 POSTOP FOLLOW-UP VISIT: CPT | Performed by: NURSE PRACTITIONER

## 2023-11-24 ENCOUNTER — OFFICE VISIT (OUTPATIENT)
Facility: CLINIC | Age: 78
End: 2023-11-24

## 2023-11-24 VITALS
WEIGHT: 114 LBS | HEIGHT: 60 IN | OXYGEN SATURATION: 98 % | DIASTOLIC BLOOD PRESSURE: 76 MMHG | BODY MASS INDEX: 22.38 KG/M2 | SYSTOLIC BLOOD PRESSURE: 154 MMHG | TEMPERATURE: 97.8 F | HEART RATE: 88 BPM

## 2023-11-24 DIAGNOSIS — I10 ESSENTIAL HYPERTENSION: ICD-10-CM

## 2023-11-24 DIAGNOSIS — F03.A0 MILD DEMENTIA WITHOUT BEHAVIORAL DISTURBANCE, PSYCHOTIC DISTURBANCE, MOOD DISTURBANCE, OR ANXIETY, UNSPECIFIED DEMENTIA TYPE (HCC): Primary | ICD-10-CM

## 2023-11-24 ASSESSMENT — PATIENT HEALTH QUESTIONNAIRE - PHQ9
SUM OF ALL RESPONSES TO PHQ QUESTIONS 1-9: 0
SUM OF ALL RESPONSES TO PHQ9 QUESTIONS 1 & 2: 0
2. FEELING DOWN, DEPRESSED OR HOPELESS: 0
SUM OF ALL RESPONSES TO PHQ QUESTIONS 1-9: 0
1. LITTLE INTEREST OR PLEASURE IN DOING THINGS: 0

## 2023-11-24 NOTE — PROGRESS NOTES
Gera Mott presents today for   Chief Complaint   Patient presents with    Other     Village paperwork        Is someone accompanying this pt? Yes     Is the patient using any DME equipment during OV? No     Health Maintenance reviewed and discussed and ordered per Provider. Health Maintenance Due   Topic Date Due    Hepatitis C screen  Never done    DTaP/Tdap/Td vaccine (1 - Tdap) Never done    Shingles vaccine (1 of 2) Never done    DEXA (modify frequency per FRAX score)  Never done    Pneumococcal 65+ years Vaccine (1 - PCV) Never done    Flu vaccine (1) 08/01/2023    COVID-19 Vaccine (4 - 2023-24 season) 09/01/2023   . Coordination of Care:  1. \"Have you been to the ER, urgent care clinic since your last visit? Hospitalized since your last visit? \" No    2. \"Have you seen or consulted any other health care providers outside of the [address] [place] [address] since your last visit? \" No    3. For patients aged 43-73: Has the patient had a colonoscopy? N/A based on age/sex    If the patient is female:    4. For patients aged 43-66: Has the patient had a mammogram within the past 2 years? N/A based on age/sex    5. For patients aged 21-65: Has the patient had a pap smear?  N/A based on age/sex

## 2023-12-07 ENCOUNTER — TELEPHONE (OUTPATIENT)
Facility: CLINIC | Age: 78
End: 2023-12-07

## 2023-12-07 DIAGNOSIS — F03.A18 MILD DEMENTIA WITH OTHER BEHAVIORAL DISTURBANCE, UNSPECIFIED DEMENTIA TYPE (HCC): Primary | ICD-10-CM

## 2023-12-07 RX ORDER — LORAZEPAM 0.5 MG/1
0.5 TABLET ORAL NIGHTLY
Qty: 30 TABLET | Refills: 0 | Status: SHIPPED | OUTPATIENT
Start: 2023-12-07 | End: 2024-01-06

## 2023-12-07 NOTE — TELEPHONE ENCOUNTER
Patient's son Terri Wright who is her medical power of  admitted to giving patient 0.5 mg Ativan at bedtime for behavioral issues which was not prescribed to her. Suspect this is the cause of her increased anxiousness as she may be withdrawing from the medication. Will prescribe 0.5 mg Ativan nightly. According to Terri Wright, she has been on Ativan in the past.  Would advise against this long-term as it worsens dementia, increases risk for falls, causes respiratory depression and she is at increased risk for accidental overdose. She has follow-up appoint with me in about a week. We can discuss this more at that appointment and attempt to wean her off of the medication. Attempted to call Terri Wright and got voicemail.

## 2023-12-07 NOTE — TELEPHONE ENCOUNTER
Sabina Lombard from the [facility] called, Patient moved in last night and patient stayed up the whole night, was argumentative with staff. Patients son is requesting an anxiety medication.

## 2023-12-07 NOTE — TELEPHONE ENCOUNTER
Called patient's son to speak about message from Hodan Carter MD. Patients son would like to have patient put on xanax 0.5 mg for her anxiety. Son states he has given her some and she was on Ativan in the past. Patient's son Jolene Mcdermott would like to speak to you.    [phone]

## 2023-12-14 ENCOUNTER — HOSPITAL ENCOUNTER (EMERGENCY)
Age: 78
Discharge: HOME | End: 2023-12-14
Attending: EMERGENCY MEDICINE
Payer: MEDICARE

## 2023-12-14 VITALS
RESPIRATION RATE: 18 BRPM | WEIGHT: 118 LBS | OXYGEN SATURATION: 98 % | DIASTOLIC BLOOD PRESSURE: 70 MMHG | HEART RATE: 65 BPM | SYSTOLIC BLOOD PRESSURE: 162 MMHG | BODY MASS INDEX: 23.05 KG/M2 | TEMPERATURE: 97.9 F

## 2023-12-14 DIAGNOSIS — S00.85XA FOREIGN BODY IN SKIN OF FACE: Primary | ICD-10-CM

## 2023-12-14 PROCEDURE — 99283 EMERGENCY DEPT VISIT LOW MDM: CPT

## 2023-12-14 RX ORDER — TETRACAINE HYDROCHLORIDE 5 MG/ML
1 SOLUTION OPHTHALMIC ONCE
Status: DISCONTINUED | OUTPATIENT
Start: 2023-12-14 | End: 2023-12-14 | Stop reason: HOSPADM

## 2023-12-14 NOTE — DISCHARGE INSTRUCTIONS
Return for pain,any vision changes,any new confusion from baseline, fever not resolving with motrin or tylenol, shortness of breath, vomiting, decreased fluid intake, weakness, numbness, dizziness, or any change or concerns.

## 2023-12-14 NOTE — ED TRIAGE NOTES
Pt arrived via ems for nail polish on eyelids. Pt is from memory care at the [facility] and used her nail polish as eye shadow. Pt denies pain to her eyes or burning. Eyes appear wnl, no redness or tearing noted.

## 2023-12-14 NOTE — ED PROVIDER NOTES
[facility] EMERGENCY DEPT  EMERGENCY DEPARTMENT ENCOUNTER      Pt Name: Lamine Blanco  MRN: [id number]  [address] [facility] 1945  Date of evaluation: 12/14/2023  Provider: Jose Raul Huffman MD    CHIEF COMPLAINT       Chief Complaint   Patient presents with    nail polish on eyelids       HPI:  Lamine Blanco is a 77 y.o. female who presents to the emergency department pt sent from [facility], has h/o dementia, at baseline per report. Found w nail polish raound b/l eyes and in eyelids. No eye redness or vision changes. Pt w no complaints. HPI    Nursing Notes were reviewed. REVIEW OF SYSTEMS    (2-9 systems for level 4, 10 or more for level 5)     Review of Systems    Except as noted above the remainder of the review of systems was reviewed and negative. PAST MEDICAL HISTORY     Past Medical History:   Diagnosis Date    Deep vein blood clot of right lower extremity ([address]) 01/05/2020    Hypertension          SURGICAL HISTORY     History reviewed. No pertinent surgical history. CURRENT MEDICATIONS       Previous Medications    AMLODIPINE (NORVASC) 5 MG TABLET    Take 1 tablet by mouth daily    LORAZEPAM (ATIVAN) 0.5 MG TABLET    Take 1 tablet by mouth nightly for 30 days. Max Daily Amount: 0.5 mg    MEMANTINE (NAMENDA) 5 MG TABLET    Take 1 tablet by mouth in the morning, at noon, in the evening, and at bedtime    PERINDOPRIL (ACEON) 8 MG TABLET    TAKE ONE TABLET BY MOUTH EVERY DAY FOR HIGH BLOOD PRESSURE       ALLERGIES     Patient has no known allergies. FAMILY HISTORY     History reviewed. No pertinent family history.        SOCIAL HISTORY       Social History     Socioeconomic History    Marital status:      Spouse name: None    Number of children: None    Years of education: None    Highest education level: None   Tobacco Use    Smoking status: Never    Smokeless tobacco: Former   Vaping Use    Vaping Use: Never used   Substance and Sexual Activity    Alcohol use: Not Currently    Drug

## 2024-09-12 ENCOUNTER — HOSPITAL ENCOUNTER (OUTPATIENT)
Age: 79
Setting detail: SPECIMEN
Discharge: HOME | End: 2024-09-15
Payer: MEDICARE

## 2024-09-12 LAB
ALBUMIN SERPL-MCNC: 3.6 G/DL (ref 3.4–5)
ALBUMIN/GLOB SERPL: 1.2 (ref 0.8–1.7)
ALP SERPL-CCNC: 108 U/L (ref 45–117)
ALT SERPL-CCNC: 16 U/L (ref 13–56)
ANION GAP SERPL CALC-SCNC: 6 MMOL/L (ref 3–18)
AST SERPL W P-5'-P-CCNC: 18 U/L (ref 10–38)
BILIRUB SERPL-MCNC: 0.6 MG/DL (ref 0.2–1)
BUN SERPL-MCNC: 18 MG/DL (ref 7–18)
BUN/CREAT SERPL: 24 (ref 12–20)
CA-I BLD-MCNC: 8.9 MG/DL (ref 8.5–10.1)
CHLORIDE SERPL-SCNC: 104 MMOL/L (ref 100–111)
CO2 SERPL-SCNC: 31 MMOL/L (ref 21–32)
CREAT SERPL-MCNC: 0.74 MG/DL (ref 0.6–1.3)
GLOBULIN SER CALC-MCNC: 3 G/DL (ref 2–4)
GLUCOSE SERPL-MCNC: 90 MG/DL (ref 74–99)
POTASSIUM SERPL-SCNC: 4.1 MMOL/L (ref 3.5–5.5)
PROT SERPL-MCNC: 6.6 G/DL (ref 6.4–8.2)
SODIUM SERPL-SCNC: 141 MMOL/L (ref 136–145)
TSH SERPL DL<=0.05 MIU/L-ACNC: 4.41 UIU/ML (ref 0.36–3.74)

## 2024-09-12 PROCEDURE — 80053 COMPREHEN METABOLIC PANEL: CPT

## 2024-09-12 PROCEDURE — 84443 ASSAY THYROID STIM HORMONE: CPT

## 2024-09-13 ENCOUNTER — HOSPITAL ENCOUNTER (OUTPATIENT)
Age: 79
Setting detail: SPECIMEN
Discharge: HOME | End: 2024-09-16
Payer: MEDICARE

## 2024-09-13 LAB
ERYTHROCYTE [DISTWIDTH] IN BLOOD BY AUTOMATED COUNT: 12.7 % (ref 11.6–14.5)
HCT VFR BLD AUTO: 34.4 % (ref 35–45)
HGB BLD-MCNC: 11.8 G/DL (ref 12–16)
MCH RBC QN AUTO: 32.4 PG (ref 24–34)
MCHC RBC AUTO-ENTMCNC: 34.3 G/DL (ref 31–37)
MCV RBC AUTO: 94.5 FL (ref 78–100)
NRBC # BLD: 0 K/UL (ref 0–0.01)
NRBC BLD-RTO: 0 PER 100 WBC
PLATELET # BLD AUTO: 251 K/UL (ref 135–420)
PMV BLD AUTO: 10.6 FL (ref 9.2–11.8)
RBC # BLD AUTO: 3.64 M/UL (ref 4.2–5.3)
WBC # BLD AUTO: 4.3 K/UL (ref 4.6–13.2)

## 2024-09-13 PROCEDURE — 36415 COLL VENOUS BLD VENIPUNCTURE: CPT

## 2024-09-13 PROCEDURE — 85027 COMPLETE CBC AUTOMATED: CPT

## 2024-12-13 ENCOUNTER — HOSPITAL ENCOUNTER (OUTPATIENT)
Age: 79
Setting detail: SPECIMEN
Discharge: HOME | End: 2024-12-16
Payer: MEDICARE

## 2024-12-13 LAB
APPEARANCE UR: ABNORMAL
BACTERIA URNS QL MICRO: ABNORMAL /HPF
BILIRUB UR QL: NEGATIVE
CAOX CRY URNS QL MICRO: ABNORMAL
COLOR UR: YELLOW
EPITH CASTS URNS QL MICRO: ABNORMAL /LPF (ref 0–20)
GLUCOSE UR STRIP.AUTO-MCNC: NEGATIVE MG/DL
HGB UR QL STRIP: NEGATIVE
KETONES UR QL STRIP.AUTO: NEGATIVE MG/DL
LEUKOCYTE ESTERASE UR QL STRIP.AUTO: ABNORMAL
MUCOUS THREADS URNS QL MICRO: NEGATIVE /LPF
NITRITE UR QL STRIP.AUTO: POSITIVE
PH UR STRIP: 6 (ref 5–8)
PROT UR STRIP-MCNC: NEGATIVE MG/DL
RBC #/AREA URNS HPF: ABNORMAL /HPF (ref 0–2)
SP GR UR REFRACTOMETRY: 1.02 (ref 1–1.03)
UROBILINOGEN UR QL STRIP.AUTO: 0.2 EU/DL (ref 0.2–1)
WBC URNS QL MICRO: ABNORMAL /HPF (ref 0–4)

## 2024-12-13 PROCEDURE — 87088 URINE BACTERIA CULTURE: CPT

## 2024-12-13 PROCEDURE — 87086 URINE CULTURE/COLONY COUNT: CPT

## 2024-12-13 PROCEDURE — 81001 URINALYSIS AUTO W/SCOPE: CPT

## 2024-12-13 PROCEDURE — 87186 SC STD MICRODIL/AGAR DIL: CPT

## 2024-12-15 LAB
BACTERIA SPEC CULT: ABNORMAL
COLONY COUNT, CNT: ABNORMAL
Lab: ABNORMAL

## 2025-01-13 ENCOUNTER — HOSPITAL ENCOUNTER (OUTPATIENT)
Age: 80
Setting detail: SPECIMEN
Discharge: HOME | End: 2025-01-16
Payer: MEDICARE

## 2025-01-13 LAB
AMORPH CRY URNS QL MICRO: ABNORMAL
APPEARANCE UR: ABNORMAL
BACTERIA URNS QL MICRO: ABNORMAL /HPF
BILIRUB UR QL: NEGATIVE
COLOR UR: YELLOW
EPITH CASTS URNS QL MICRO: ABNORMAL /LPF (ref 0–20)
GLUCOSE UR STRIP.AUTO-MCNC: NEGATIVE MG/DL
HGB UR QL STRIP: NEGATIVE
KETONES UR QL STRIP.AUTO: NEGATIVE MG/DL
LEUKOCYTE ESTERASE UR QL STRIP.AUTO: ABNORMAL
MUCOUS THREADS URNS QL MICRO: NEGATIVE /LPF
NITRITE UR QL STRIP.AUTO: NEGATIVE
PH UR STRIP: >8.5 [PH] (ref 5–8)
PROT UR STRIP-MCNC: 30 MG/DL
RBC #/AREA URNS HPF: ABNORMAL /HPF (ref 0–2)
SP GR UR REFRACTOMETRY: 1.02 (ref 1–1.03)
UROBILINOGEN UR QL STRIP.AUTO: 0.2 EU/DL (ref 0.2–1)
WBC URNS QL MICRO: ABNORMAL /HPF (ref 0–4)

## 2025-01-13 PROCEDURE — 81001 URINALYSIS AUTO W/SCOPE: CPT

## 2025-01-13 PROCEDURE — 87086 URINE CULTURE/COLONY COUNT: CPT

## 2025-01-15 LAB
BACTERIA SPEC CULT: NORMAL
COLONY COUNT, CNT: NORMAL
Lab: NORMAL

## 2025-01-31 ENCOUNTER — HOSPITAL ENCOUNTER (OUTPATIENT)
Age: 80
Setting detail: SPECIMEN
Discharge: HOME | End: 2025-01-31
Payer: MEDICARE

## 2025-01-31 LAB
AMORPH CRY URNS QL MICRO: NORMAL
APPEARANCE UR: ABNORMAL
BACTERIA URNS QL MICRO: NEGATIVE /HPF
BILIRUB UR QL: NEGATIVE
COLOR UR: YELLOW
GLUCOSE UR STRIP.AUTO-MCNC: NEGATIVE MG/DL
HGB UR QL STRIP: NEGATIVE
KETONES UR QL STRIP.AUTO: NEGATIVE MG/DL
LEUKOCYTE ESTERASE UR QL STRIP.AUTO: ABNORMAL
NITRITE UR QL STRIP.AUTO: NEGATIVE
PH UR STRIP: >8.5 [PH] (ref 5–8)
PROT UR STRIP-MCNC: 30 MG/DL
RBC #/AREA URNS HPF: NORMAL /HPF (ref 0–2)
SP GR UR REFRACTOMETRY: 1.02 (ref 1–1.03)
TRI-PHOS CRY URNS QL MICRO: NORMAL
UROBILINOGEN UR QL STRIP.AUTO: 0.2 EU/DL (ref 0.2–1)
WBC URNS QL MICRO: NORMAL /HPF (ref 0–4)

## 2025-01-31 PROCEDURE — 81001 URINALYSIS AUTO W/SCOPE: CPT

## 2025-01-31 PROCEDURE — 87086 URINE CULTURE/COLONY COUNT: CPT

## 2025-01-31 PROCEDURE — 87088 URINE BACTERIA CULTURE: CPT

## 2025-01-31 PROCEDURE — 87186 SC STD MICRODIL/AGAR DIL: CPT

## 2025-02-02 LAB
BACTERIA SPEC CULT: ABNORMAL
COLONY COUNT, CNT: ABNORMAL
Lab: ABNORMAL

## 2025-02-09 ENCOUNTER — APPOINTMENT (OUTPATIENT)
Age: 80
End: 2025-02-09
Attending: EMERGENCY MEDICINE
Payer: MEDICARE

## 2025-02-09 ENCOUNTER — APPOINTMENT (OUTPATIENT)
Age: 80
End: 2025-02-09
Payer: MEDICARE

## 2025-02-09 ENCOUNTER — HOSPITAL ENCOUNTER (EMERGENCY)
Age: 80
Discharge: HOME | End: 2025-02-09
Attending: EMERGENCY MEDICINE
Payer: MEDICARE

## 2025-02-09 VITALS
HEART RATE: 69 BPM | HEIGHT: 62 IN | BODY MASS INDEX: 25.28 KG/M2 | OXYGEN SATURATION: 98 % | RESPIRATION RATE: 18 BRPM | SYSTOLIC BLOOD PRESSURE: 134 MMHG | TEMPERATURE: 98.4 F | WEIGHT: 137.4 LBS | DIASTOLIC BLOOD PRESSURE: 60 MMHG

## 2025-02-09 DIAGNOSIS — R10.9 ABDOMINAL PAIN, UNSPECIFIED ABDOMINAL LOCATION: Primary | ICD-10-CM

## 2025-02-09 DIAGNOSIS — K59.00 CONSTIPATION, UNSPECIFIED CONSTIPATION TYPE: ICD-10-CM

## 2025-02-09 LAB
ALBUMIN SERPL-MCNC: 3.2 G/DL (ref 3.4–5)
ALBUMIN/GLOB SERPL: 0.9 (ref 0.8–1.7)
ALP SERPL-CCNC: 92 U/L (ref 45–117)
ALT SERPL-CCNC: 18 U/L (ref 13–56)
ANION GAP SERPL CALC-SCNC: 5 MMOL/L (ref 3–18)
APPEARANCE UR: CLEAR
AST SERPL W P-5'-P-CCNC: 17 U/L (ref 10–38)
BACTERIA URNS QL MICRO: ABNORMAL /HPF
BASOPHILS # BLD: 0.07 K/UL (ref 0–0.1)
BASOPHILS NFR BLD: 1.4 % (ref 0–2)
BILIRUB SERPL-MCNC: 0.4 MG/DL (ref 0.2–1)
BILIRUB UR QL: NEGATIVE
BNP SERPL-MCNC: 237 PG/ML (ref 0–1800)
BUN SERPL-MCNC: 24 MG/DL (ref 7–18)
BUN/CREAT SERPL: 33 (ref 12–20)
CA-I BLD-MCNC: 8.8 MG/DL (ref 8.5–10.1)
CHLORIDE SERPL-SCNC: 108 MMOL/L (ref 100–111)
CO2 SERPL-SCNC: 29 MMOL/L (ref 21–32)
COLOR UR: YELLOW
CREAT SERPL-MCNC: 0.72 MG/DL (ref 0.6–1.3)
DIFFERENTIAL METHOD BLD: ABNORMAL
EKG ATRIAL RATE: 67 BPM
EKG DIAGNOSIS: NORMAL
EKG P AXIS: 34 DEGREES
EKG P-R INTERVAL: 132 MS
EKG Q-T INTERVAL: 450 MS
EKG QRS DURATION: 138 MS
EKG QTC CALCULATION (BAZETT): 475 MS
EKG R AXIS: -37 DEGREES
EKG T AXIS: 81 DEGREES
EKG VENTRICULAR RATE: 67 BPM
EOSINOPHIL # BLD: 0.19 K/UL (ref 0–0.4)
EOSINOPHIL NFR BLD: 3.8 % (ref 0–5)
EPITH CASTS URNS QL MICRO: ABNORMAL /LPF (ref 0–20)
ERYTHROCYTE [DISTWIDTH] IN BLOOD BY AUTOMATED COUNT: 13 % (ref 11.6–14.5)
FLUAV RNA SPEC QL NAA+PROBE: NOT DETECTED
FLUBV RNA SPEC QL NAA+PROBE: NOT DETECTED
GLOBULIN SER CALC-MCNC: 3.5 G/DL (ref 2–4)
GLUCOSE SERPL-MCNC: 98 MG/DL (ref 74–99)
GLUCOSE UR STRIP.AUTO-MCNC: NEGATIVE MG/DL
HCT VFR BLD AUTO: 38.2 % (ref 35–45)
HGB BLD-MCNC: 12.7 G/DL (ref 12–16)
HGB UR QL STRIP: NEGATIVE
IMM GRANULOCYTES # BLD AUTO: 0.01 K/UL (ref 0–0.04)
IMM GRANULOCYTES NFR BLD AUTO: 0.2 % (ref 0–0.5)
KETONES UR QL STRIP.AUTO: NEGATIVE MG/DL
LEUKOCYTE ESTERASE UR QL STRIP.AUTO: ABNORMAL
LIPASE SERPL-CCNC: 25 U/L (ref 13–75)
LYMPHOCYTES # BLD: 1.45 K/UL (ref 0.9–3.6)
LYMPHOCYTES NFR BLD: 29.3 % (ref 21–52)
MCH RBC QN AUTO: 31.7 PG (ref 24–34)
MCHC RBC AUTO-ENTMCNC: 33.2 G/DL (ref 31–37)
MCV RBC AUTO: 95.3 FL (ref 78–100)
MONOCYTES # BLD: 0.48 K/UL (ref 0.05–1.2)
MONOCYTES NFR BLD: 9.7 % (ref 3–10)
NEUTS SEG # BLD: 2.75 K/UL (ref 1.8–8)
NEUTS SEG NFR BLD: 55.6 % (ref 40–73)
NITRITE UR QL STRIP.AUTO: NEGATIVE
NRBC # BLD: 0 K/UL (ref 0–0.01)
NRBC BLD-RTO: 0 PER 100 WBC
PH UR STRIP: 6.5 (ref 5–8)
PLATELET # BLD AUTO: 260 K/UL (ref 135–420)
PMV BLD AUTO: 10.6 FL (ref 9.2–11.8)
POTASSIUM SERPL-SCNC: 3.7 MMOL/L (ref 3.5–5.5)
PROT SERPL-MCNC: 6.7 G/DL (ref 6.4–8.2)
PROT UR STRIP-MCNC: NEGATIVE MG/DL
RBC # BLD AUTO: 4.01 M/UL (ref 4.2–5.3)
RBC #/AREA URNS HPF: ABNORMAL /HPF (ref 0–2)
SARS-COV-2 RNA RESP QL NAA+PROBE: NOT DETECTED
SODIUM SERPL-SCNC: 142 MMOL/L (ref 136–145)
SP GR UR REFRACTOMETRY: 1.02 (ref 1–1.03)
TROPONIN I SERPL HS-MCNC: 8 NG/L (ref 0–54)
TROPONIN I SERPL HS-MCNC: 8 NG/L (ref 0–54)
UROBILINOGEN UR QL STRIP.AUTO: 0.2 EU/DL (ref 0.2–1)
WBC # BLD AUTO: 5 K/UL (ref 4.6–13.2)
WBC URNS QL MICRO: ABNORMAL /HPF (ref 0–4)

## 2025-02-09 PROCEDURE — 74176 CT ABD & PELVIS W/O CONTRAST: CPT

## 2025-02-09 PROCEDURE — 93005 ELECTROCARDIOGRAM TRACING: CPT | Performed by: EMERGENCY MEDICINE

## 2025-02-09 PROCEDURE — 81001 URINALYSIS AUTO W/SCOPE: CPT

## 2025-02-09 PROCEDURE — 87636 SARSCOV2 & INF A&B AMP PRB: CPT

## 2025-02-09 PROCEDURE — 84484 ASSAY OF TROPONIN QUANT: CPT

## 2025-02-09 PROCEDURE — 83880 ASSAY OF NATRIURETIC PEPTIDE: CPT

## 2025-02-09 PROCEDURE — 80053 COMPREHEN METABOLIC PANEL: CPT

## 2025-02-09 PROCEDURE — 83690 ASSAY OF LIPASE: CPT

## 2025-02-09 PROCEDURE — 85025 COMPLETE CBC W/AUTO DIFF WBC: CPT

## 2025-02-09 PROCEDURE — 71045 X-RAY EXAM CHEST 1 VIEW: CPT

## 2025-02-09 PROCEDURE — 99285 EMERGENCY DEPT VISIT HI MDM: CPT

## 2025-02-09 RX ORDER — TRAZODONE HYDROCHLORIDE 50 MG/1
50 TABLET, FILM COATED ORAL NIGHTLY
COMMUNITY

## 2025-02-09 RX ORDER — BUSPIRONE HYDROCHLORIDE 5 MG/1
5 TABLET ORAL 3 TIMES DAILY
COMMUNITY

## 2025-02-09 RX ORDER — ALPRAZOLAM 0.25 MG/1
0.25 TABLET ORAL NIGHTLY PRN
COMMUNITY

## 2025-02-09 RX ORDER — SERTRALINE HYDROCHLORIDE 25 MG/1
25 TABLET, FILM COATED ORAL DAILY
COMMUNITY

## 2025-02-09 RX ORDER — CIPROFLOXACIN 250 MG/1
250 TABLET, FILM COATED ORAL 2 TIMES DAILY
COMMUNITY

## 2025-02-09 RX ORDER — POLYETHYLENE GLYCOL 3350 17 G/17G
17 POWDER, FOR SOLUTION ORAL DAILY PRN
Qty: 5 EACH | Refills: 0 | Status: SHIPPED | OUTPATIENT
Start: 2025-02-09 | End: 2025-02-14

## 2025-02-09 ASSESSMENT — PAIN SCALES - PAIN ASSESSMENT IN ADVANCED DEMENTIA (PAINAD)
BREATHING: NORMAL
FACIALEXPRESSION: SMILING OR INEXPRESSIVE
CONSOLABILITY: NO NEED TO CONSOLE
TOTALSCORE: 0
BODYLANGUAGE: RELAXED

## 2025-02-09 ASSESSMENT — PAIN - FUNCTIONAL ASSESSMENT: PAIN_FUNCTIONAL_ASSESSMENT: PAIN ASSESSMENT IN ADVANCED DEMENTIA (PAINAD)

## 2025-02-09 ASSESSMENT — LIFESTYLE VARIABLES
HOW MANY STANDARD DRINKS CONTAINING ALCOHOL DO YOU HAVE ON A TYPICAL DAY: PATIENT DOES NOT DRINK
HOW OFTEN DO YOU HAVE A DRINK CONTAINING ALCOHOL: NEVER

## 2025-02-09 NOTE — ED PROVIDER NOTES
[facility] EMERGENCY DEPARTMENT  EMERGENCY DEPARTMENT ENCOUNTER       Pt Name: Mena Prieto  MRN: [id number]  Birthdate 1945  Date of evaluation: 2/9/2025  Provider: Jarret Louis MD   PCP: Danielle Edwards APRN - CNP  Note Started: 4:19 PM 2/9/25     CHIEF COMPLAINT       Chief Complaint   Patient presents with    generalized pain         HISTORY OF PRESENT ILLNESS: 1 or more elements      History From: Patient and Caregiver  History limited by: Dementia     Mena Prieto is a 79 y.o. female who presents to ED, brought in by caregivers, she is from the [facility] side, caregiver reports patient woke up this morning and complained of not feeling good.  Caregiver reports that she was grabbing his stomach and also complaining of chest pain.  Caregiver reports patient generally does not complain.  She patient does not speak much.  There is a history of UTI diagnosed 6 days ago and patient started on Cipro 3 days ago.  Patient only states that she feels fine.  She has no complaints.     Nursing Notes were all reviewed and agreed with or any disagreements were addressed in the HPI.     REVIEW OF SYSTEMS      Review of Systems     Positives and Pertinent negatives as per HPI.    PAST HISTORY     Past Medical History:  Past Medical History:   Diagnosis Date    Deep vein blood clot of right lower extremity (HCC) 01/05/2020    Dementia (HCC)     Hypertension        Past Surgical History:  History reviewed. No pertinent surgical history.    Family History:  History reviewed. No pertinent family history.    Social History:  Social History     Tobacco Use    Smoking status: Never    Smokeless tobacco: Former   Vaping Use    Vaping status: Never Used   Substance Use Topics    Alcohol use: Not Currently    Drug use: Never       Allergies:  No Known Allergies    CURRENT MEDICATIONS      Discharge Medication List as of 2/9/2025 11:07 AM        CONTINUE these medications which

## 2025-02-09 NOTE — ED TRIAGE NOTES
Charge nurse from [place] called back and stated patient did receive 5 doses of the Cipro, started on 2/6/25.

## 2025-02-09 NOTE — ED TRIAGE NOTES
Patient from memory unit at [facility], per EMS patient with c/o generalized pain and staff states she was grabbing her stomach. She is holding her baby doll. Per the aid from [facility] she states the patient never complains and she was holding her stomach and chest earlier. The patient does not speak much and has dementia. Per the charge nurse on the phone patient was diagnosed with a UTI this past Tuesday and prescribed Cipro 250 mg Q12H but has not started taking it.